# Patient Record
Sex: FEMALE | Race: WHITE | NOT HISPANIC OR LATINO | Employment: FULL TIME | URBAN - METROPOLITAN AREA
[De-identification: names, ages, dates, MRNs, and addresses within clinical notes are randomized per-mention and may not be internally consistent; named-entity substitution may affect disease eponyms.]

---

## 2017-01-06 ENCOUNTER — OFFICE VISIT (OUTPATIENT)
Dept: MEDICAL GROUP | Facility: MEDICAL CENTER | Age: 52
End: 2017-01-06
Payer: COMMERCIAL

## 2017-01-06 VITALS
BODY MASS INDEX: 36.32 KG/M2 | DIASTOLIC BLOOD PRESSURE: 72 MMHG | HEIGHT: 65 IN | OXYGEN SATURATION: 95 % | WEIGHT: 218 LBS | SYSTOLIC BLOOD PRESSURE: 104 MMHG | HEART RATE: 84 BPM | TEMPERATURE: 98.4 F

## 2017-01-06 DIAGNOSIS — H66.001 RIGHT ACUTE SUPPURATIVE OTITIS MEDIA: ICD-10-CM

## 2017-01-06 PROCEDURE — 99213 OFFICE O/P EST LOW 20 MIN: CPT | Performed by: FAMILY MEDICINE

## 2017-01-06 RX ORDER — FLUTICASONE PROPIONATE 50 MCG
2 SPRAY, SUSPENSION (ML) NASAL DAILY
Qty: 16 G | Refills: 1 | Status: SHIPPED | OUTPATIENT
Start: 2017-01-06 | End: 2017-08-07 | Stop reason: SDUPTHER

## 2017-01-06 RX ORDER — DOXYCYCLINE HYCLATE 100 MG
100 TABLET ORAL 2 TIMES DAILY
Qty: 20 TAB | Refills: 0 | Status: SHIPPED | OUTPATIENT
Start: 2017-01-06 | End: 2017-06-27

## 2017-01-06 NOTE — PROGRESS NOTES
Subjective:     Chief Complaint   Patient presents with   • Otalgia     right side       Alexandra Masterson is a 51 y.o. female here today for evaluation and management of:    Right acute suppurative otitis media  Patient complains of right ear pain for 2 weeks. She was treated for a sinus infection on 12/15/16 with Augmentin. Her cough resolved but she continues to have pain in her right ear. She has noticed some decreased hearing. She has a history of TMJ but this pain is different and she has been wearing her bite-block. She denies any fever or chills. No nausea or vomiting.       No Known Allergies    Current medicines (including changes today)  Current Outpatient Prescriptions   Medication Sig Dispense Refill   • doxycycline (VIBRAMYCIN) 100 MG Tab Take 1 Tab by mouth 2 times a day. 20 Tab 0   • fluticasone (FLONASE) 50 MCG/ACT nasal spray Spray 2 Sprays in nose every day. 16 g 1   • estradiol (ESTRACE) 0.5 MG tablet Take 1 Tab by mouth every day. 90 Tab 2   • vitamin D, Ergocalciferol, (DRISDOL) 01706 UNITS Cap capsule      • levothyroxine (SYNTHROID) 125 MCG Tab      • progesterone (PROMETRIUM) 100 MG Cap      • citalopram (CELEXA) 20 MG TABS Take 20 mg by mouth every day.     • losartan-hydrochlorothiazide (HYZAAR) 100-25 MG per tablet Take 1 Tab by mouth every day.     • amlodipine (NORVASC) 10 MG TABS Take 10 mg by mouth every day.     • buPROPion (WELLBUTRIN XL) 300 MG XL tablet Take 300 mg by mouth every morning.     • Loratadine (CLARITIN) 10 MG CAPS Take  by mouth every day.     • Multiple Vitamins-Minerals (MULTIVITAMIN PO) Take  by mouth every day.     • amoxicillin-clavulanate (AUGMENTIN) 875-125 MG Tab Take 1 Tab by mouth 2 times a day. 20 Tab 0     No current facility-administered medications for this visit.       She  has a past medical history of Heart burn; Indigestion; Snoring; Arthritis; Anesthesia; Other specified disorder of intestines; GERD (gastroesophageal reflux disease); Thyroid  "disease; Hypertension; Acquired hypothyroidism (9/21/2016); Basal cell carcinoma (9/21/2016); Depression (9/21/2016); Essential hypertension (9/21/2016); Family history of melanoma (9/21/2016); Hyperlipidemia (9/21/2016); Prediabetes (9/21/2016); Vitamin D deficiency (9/21/2016); Viral meningitis; and Substance abuse.    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  No nausea or vomiting.  No chest pain or palpitations.  No cough or SOB.  No pain with urination or hematuria.  No black or bloody stools.       Objective:     Blood pressure 104/72, pulse 84, temperature 36.9 °C (98.4 °F), height 1.651 m (5' 5\"), weight 98.884 kg (218 lb), SpO2 95 %, not currently breastfeeding. Body mass index is 36.28 kg/(m^2).   Physical Exam:  Well developed, well nourished.  Alert, oriented in no acute distress.  Eye contact is good, speech goal directed, affect calm  Eyes: conjunctiva non-injected, sclera non-icteric.  Ears: Pinna normal. Right TM is injected with some distortion of the light reflex with purulent material behind the drum. Left TM is normal  Nose: Nares are patent.  Normal mucosa  Mouth: Oral mucous membranes pink and moist with no lesions.  Neck Supple.  No adenopathy or masses in the neck or supraclavicular regions. No thyromegaly  Lungs: clear to auscultation bilaterally with good excursion. No wheezes or rhonchi  CV: regular rate and rhythm. No " murmur        Assessment and Plan:   The following treatment plan was discussed    1. Right acute suppurative otitis media  Doxycycline twice a day for 10 days. Flonase 2 puffs each nostril every morning. Patient to call in 2 weeks if not improving and we will refer to ear nose and throat.  - doxycycline (VIBRAMYCIN) 100 MG Tab; Take 1 Tab by mouth 2 times a day.  Dispense: 20 Tab; Refill: 0  - fluticasone (FLONASE) 50 MCG/ACT nasal spray; Spray 2 Sprays in nose every day.  Dispense: 16 g; Refill: 1    Any change or worsening of signs or symptoms, patient encouraged to follow-up or report to the emergency room for further evaluation. Patient understands and agrees.    Followup: Return in about 3 weeks (around 1/27/2017).

## 2017-01-06 NOTE — ASSESSMENT & PLAN NOTE
Patient complains of right ear pain for 2 weeks. She was treated for a sinus infection on 12/15/16 with Augmentin. Her cough resolved but she continues to have pain in her right ear. She has noticed some decreased hearing. She has a history of TMJ but this pain is different and she has been wearing her bite-block. She denies any fever or chills. No nausea or vomiting.

## 2017-01-06 NOTE — MR AVS SNAPSHOT
"        Alexandra Aden Aleja   2017 7:40 AM   Office Visit   MRN: [id number]    Department:  [facility]   Dept Phone:  [phone]    Description:  Female : 1965   Provider:  Esha Browne M.D.           Reason for Visit     Otalgia right side      Allergies as of 2017     No Known Allergies      You were diagnosed with     Right acute suppurative otitis media   [857597]         Vital Signs     Blood Pressure Pulse Temperature Height Weight Body Mass Index    104/72 mmHg 84 36.9 °C (98.4 °F) 1.651 m (5' 5\") 98.884 kg (218 lb) 36.28 kg/m2    Oxygen Saturation Breastfeeding? Smoking Status             95% No Former Smoker         Basic Information     Date Of Birth Sex Race Ethnicity Preferred Language    1965 Female White Non- English      Your appointments     2017  8:40 AM   New Patient with Suhail Tian M.D.   [facility] (--)    [address] [place] [id number] [phone]           Please bring enclosed paperwork completed along with your insurance card and photo ID.              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) D43.9   2013 - Present    Essential hypertension I10   2016 - Present    Acquired hypothyroidism E03.9   2016 - Present    Depression F32.9   2016 - Present    Vitamin D deficiency E55.9   2016 - Present    Hyperlipidemia E78.5   2016 - Present    Prediabetes R73.03   2016 - Present    Weight gain R63.5   2016 - Present    Snoring R06.83   2016 - Present    Daytime somnolence R40.0   2016 - Present    Adult BMI 36.0-36.9 kg/sq m Z68.36   2016 - Present    Basal cell carcinoma C44.91   2016 - Present    Family history of melanoma Z80.8   2016 - Present    Right acute suppurative otitis media H66.001   2017 - Present      Health Maintenance        Date Due " Completion Dates    MAMMOGRAM 8/25/2003 ---    PAP SMEAR 11/1/2018 11/1/2015 (Done)    Override on 11/1/2015: Done    COLONOSCOPY 1/15/2021 1/15/2016 (Done), 11/1/2015 (Done)    Override on 1/15/2016: Done    Override on 11/1/2015: Done    IMM DTaP/Tdap/Td Vaccine (2 - Td) 11/3/2025 11/3/2015            Current Immunizations     Influenza Vaccine Quad Inj (Pf) 11/8/2016  1:24 PM    Influenza Vaccine Quad Inj (Preserved) 11/8/2016    Tdap Vaccine 11/3/2015      Below and/or attached are the medications your provider expects you to take. Review all of your home medications and newly ordered medications with your provider and/or pharmacist. Follow medication instructions as directed by your provider and/or pharmacist. Please keep your medication list with you and share with your provider. Update the information when medications are discontinued, doses are changed, or new medications (including over-the-counter products) are added; and carry medication information at all times in the event of emergency situations     Allergies:  No Known Allergies          Medications  Valid as of: January 06, 2017 - 10:03 AM    Generic Name Brand Name Tablet Size Instructions for use    AmLODIPine Besylate (Tab) NORVASC 10 MG Take 10 mg by mouth every day.        Amoxicillin-Pot Clavulanate (Tab) AUGMENTIN 875-125 MG Take 1 Tab by mouth 2 times a day.        BuPROPion HCl (TABLET SR 24 HR) WELLBUTRIN  MG Take 300 mg by mouth every morning.        Citalopram Hydrobromide (Tab) CELEXA 20 MG Take 20 mg by mouth every day.        Doxycycline Hyclate (Tab) VIBRAMYCIN 100 MG Take 1 Tab by mouth 2 times a day.        Ergocalciferol (Cap) DRISDOL 10722 UNITS         Estradiol (Tab) ESTRACE 0.5 MG Take 1 Tab by mouth every day.        Fluticasone Propionate (Suspension) FLONASE 50 MCG/ACT Spray 2 Sprays in nose every day.        Levothyroxine Sodium (Tab) SYNTHROID 125 MCG         Loratadine (Cap) Loratadine 10 MG Take  by mouth every  day.        Losartan Potassium-HCTZ (Tab) HYZAAR 100-25 MG Take 1 Tab by mouth every day.        Multiple Vitamins-Minerals   Take  by mouth every day.        Progesterone Micronized (Cap) PROMETRIUM 100 MG         .                 Medicines prescribed today were sent to:     [facility] [address] [facility], [place] - [address]    [address] [place] [address]    Phone: [phone] Fax: [phone]    Open 24 Hours?: No    [facility] [address] [facility] (), [place] - [address]    [address] () [place] [address]    Phone: [phone] Fax: [phone]    Open 24 Hours?: No      Medication refill instructions:       If your prescription bottle indicates you have medication refills left, it is not necessary to call your provider’s office. Please contact your pharmacy and they will refill your medication.    If your prescription bottle indicates you do not have any refills left, you may request refills at any time through one of the following ways: The online [employer] system (except Urgent Care), by calling your provider’s office, or by asking your pharmacy to contact your provider’s office with a refill request. Medication refills are processed only during regular business hours and may not be available until the next business day. Your provider may request additional information or to have a follow-up visit with you prior to refilling your medication.   *Please Note: Medication refills are assigned a new Rx number when refilled electronically. Your pharmacy may indicate that no refills were authorized even though a new prescription for the same medication is available at the pharmacy. Please request the medicine by name with the pharmacy before contacting your provider for a refill.           [employer] Access Code: Activation code not generated  Current [employer] Status: Active

## 2017-01-30 ENCOUNTER — SLEEP CENTER VISIT (OUTPATIENT)
Dept: SLEEP MEDICINE | Facility: MEDICAL CENTER | Age: 52
End: 2017-01-30
Payer: COMMERCIAL

## 2017-01-30 VITALS
RESPIRATION RATE: 16 BRPM | HEART RATE: 80 BPM | WEIGHT: 217 LBS | DIASTOLIC BLOOD PRESSURE: 70 MMHG | SYSTOLIC BLOOD PRESSURE: 112 MMHG | BODY MASS INDEX: 36.15 KG/M2 | HEIGHT: 65 IN

## 2017-01-30 DIAGNOSIS — G47.33 OBSTRUCTIVE SLEEP APNEA: ICD-10-CM

## 2017-01-30 PROCEDURE — 99213 OFFICE O/P EST LOW 20 MIN: CPT | Performed by: INTERNAL MEDICINE

## 2017-01-30 NOTE — MR AVS SNAPSHOT
"        Alexandra Aden Aleja   2017 8:40 AM   Sleep Center Visit   MRN: [id number]    Department:  [facility]   Dept Phone:  [phone]    Description:  Female : 1965   Provider:  Suhail Tian M.D.           Reason for Visit     New Patient sleep eval      Allergies as of 2017     No Known Allergies      You were diagnosed with     Obstructive sleep apnea   [076638]         Vital Signs     Blood Pressure Pulse Respirations Height Weight Body Mass Index    112/70 mmHg 80 16 1.651 m (5' 5\") 98.431 kg (217 lb) 36.11 kg/m2    Smoking Status                   Former Smoker           Basic Information     Date Of Birth Sex Race Ethnicity Preferred Language    1965 Female White Non- English      Your appointments     Mar 02, 2017  7:10 PM   Sleep Study Diagnostic with SLEEP TECH   [facility] (--)    [address]  [place] [id number] [phone]            Mar 16, 2017  8:40 AM   Follow UP with ANASTASIIA Aguila   [facility] (--)    [address]  [address]  [place] [id number] [phone]              Problem List              ICD-10-CM Priority Class Noted - Resolved    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   2013 - Present    Essential hypertension I10   2016 - Present    Acquired hypothyroidism E03.9   2016 - Present    Depression F32.9   2016 - Present    Vitamin D deficiency E55.9   2016 - Present    Hyperlipidemia E78.5   2016 - Present    Prediabetes R73.03   2016 - Present    Weight gain R63.5   2016 - Present    Snoring R06.83   2016 - Present    Daytime somnolence R40.0   2016 - Present    Adult BMI 36.0-36.9 kg/sq m Z68.36   2016 - Present    Basal cell carcinoma C44.91   2016 - Present    Family history of melanoma Z80.8   2016 - Present    Right acute suppurative otitis media H66.001   " 1/6/2017 - Present      Health Maintenance        Date Due Completion Dates    MAMMOGRAM 8/25/2003 ---    PAP SMEAR 11/1/2018 11/1/2015 (Done)    Override on 11/1/2015: Done    COLONOSCOPY 1/15/2021 1/15/2016 (Done), 11/1/2015 (Done)    Override on 1/15/2016: Done    Override on 11/1/2015: Done    IMM DTaP/Tdap/Td Vaccine (2 - Td) 11/3/2025 11/3/2015            Current Immunizations     Influenza Vaccine Quad Inj (Pf) 11/8/2016  1:24 PM    Influenza Vaccine Quad Inj (Preserved) 11/8/2016    Tdap Vaccine 11/3/2015      Below and/or attached are the medications your provider expects you to take. Review all of your home medications and newly ordered medications with your provider and/or pharmacist. Follow medication instructions as directed by your provider and/or pharmacist. Please keep your medication list with you and share with your provider. Update the information when medications are discontinued, doses are changed, or new medications (including over-the-counter products) are added; and carry medication information at all times in the event of emergency situations     Allergies:  No Known Allergies          Medications  Valid as of: January 30, 2017 -  9:29 AM    Generic Name Brand Name Tablet Size Instructions for use    AmLODIPine Besylate (Tab) NORVASC 10 MG Take 10 mg by mouth every day.        Amoxicillin-Pot Clavulanate (Tab) AUGMENTIN 875-125 MG Take 1 Tab by mouth 2 times a day.        BuPROPion HCl (TABLET SR 24 HR) WELLBUTRIN  MG Take 300 mg by mouth every morning.        Citalopram Hydrobromide (Tab) CELEXA 20 MG Take 20 mg by mouth every day.        Doxycycline Hyclate (Tab) VIBRAMYCIN 100 MG Take 1 Tab by mouth 2 times a day.        Ergocalciferol (Cap) DRISDOL 66534 UNITS         Estradiol (Tab) ESTRACE 0.5 MG Take 1 Tab by mouth every day.        Fluticasone Propionate (Suspension) FLONASE 50 MCG/ACT Spray 2 Sprays in nose every day.        Levothyroxine Sodium (Tab) SYNTHROID 125 MCG          Loratadine (Cap) Loratadine 10 MG Take  by mouth every day.        Losartan Potassium-HCTZ (Tab) HYZAAR 100-25 MG Take 1 Tab by mouth every day.        Multiple Vitamins-Minerals   Take  by mouth every day.        Progesterone Micronized (Cap) PROMETRIUM 100 MG         .                 Medicines prescribed today were sent to:     [facility] [address] [facility], [place] - [address]    [address] [place] [address]    Phone: [phone] Fax: [phone]    Open 24 Hours?: No    [facility] [address] [facility] (), [place] - [address]    [address] () [place] [address]    Phone: [phone] Fax: [phone]    Open 24 Hours?: No      Medication refill instructions:       If your prescription bottle indicates you have medication refills left, it is not necessary to call your provider’s office. Please contact your pharmacy and they will refill your medication.    If your prescription bottle indicates you do not have any refills left, you may request refills at any time through one of the following ways: The online [employer] system (except Urgent Care), by calling your provider’s office, or by asking your pharmacy to contact your provider’s office with a refill request. Medication refills are processed only during regular business hours and may not be available until the next business day. Your provider may request additional information or to have a follow-up visit with you prior to refilling your medication.   *Please Note: Medication refills are assigned a new Rx number when refilled electronically. Your pharmacy may indicate that no refills were authorized even though a new prescription for the same medication is available at the pharmacy. Please request the medicine by name with the pharmacy before contacting your provider for a refill.        Your To Do List     Future Labs/Procedures Complete By Expires    POLYSOMNOGRAPHY, 4 OR MORE  As directed 1/30/2018    Comments:    Please do  split study if meets diagnostic criteria      Instructions    1. We will schedule a sleep study to evaluate for obstructive sleep apnea  2. We have offered a sleep aid to help with the sleep during the sleep study. Please let us know if he would like to have a sleep aid.  3. Recommend avoiding alcohol and sedatives and to not operate a motor vehicle while drowsy  4. Recommend follow-up after the sleep study          [employer] Access Code: Activation code not generated  Current [employer] Status: Active

## 2017-01-30 NOTE — PATIENT INSTRUCTIONS
1. We will schedule a sleep study to evaluate for obstructive sleep apnea  2. We have offered a sleep aid to help with the sleep during the sleep study. Please let us know if he would like to have a sleep aid.  3. Recommend avoiding alcohol and sedatives and to not operate a motor vehicle while drowsy  4. Recommend follow-up after the sleep study

## 2017-01-30 NOTE — PROGRESS NOTES
Alexandra Masterson is a 51 y.o. female here for snoring.  Patient was referred by Dr. Esha Browne.    History of Present Illness:    The patient is a 51-year-old female with a history of hypertension, hyperlipidemia and prediabetes. She comes in complaining of snoring. Apparently this is caused her  to go to a different bedroom. She's also complained of daytime sleepiness and weight gain. She does a bit at 9:00 at night and will fall sleep within a few minutes. She wake up at 5:30 in the morning feeling okay in the morning. In the afternoon she tends to get sluggish and tired. During the night she will wake up to go to the bathroom 4 times on average. She sometimes wakes up choking and gasping. She will have morning headaches. She denies waking up with a sore throat or dry throat. She does not know specifically if she stops breathing in her sleep. It's never been mentioned to her. She denies any nocturnal sweats. She has no history of sleepwalking or seizures or physically acting out dreams. She will have some sleep talking. She denies any cataplexy or sleep paralysis. She will complain of some restless leg symptoms but this occurs occasionally. This does not stop her from falling asleep. She has been kicking her legs and her sleep. During the daytime she does feel fatigued and will fall asleep in sedentary situations such as reading or watching TV. She denies any drowsy driving. She has no history of lung disease although she is a prior smoker. She smoked a half a pack a day for 30 years. She quit smoking 2 years ago. She has no cough or congestion chest pains or pleurisy. She denies a history of heart disease.    Constitutional:  Negative for fever, chills, sweats, and fatigue.  Eyes:  Negative for eye pain and visual changes.  HENT:  Negative for tinnitus and hoarse voice.  Cardiovascular:  Negative for chest pain, leg swelling, syncope and orthopnea.  Respiratory:  See HPI for pertinent  negatives  Sleep: Positive for somnolence, loud snoring, sleep disturbance due to breathing, insomnia.  Gastrointestinal:  Negative for dysphagia, nausea and abdominal pain.  Heme/lymph:  Denies easy bruising, blood clots.  Musculoskeletal:  Negative for arthralgias, sore muscles and back pain.  Skin:  Negative for rash and color change.  Neurological:  Negative for headaches, lightheadedness and weakness.  Psychiatric:  Denies depression.    Current Outpatient Prescriptions   Medication Sig Dispense Refill   • estradiol (ESTRACE) 0.5 MG tablet Take 1 Tab by mouth every day. 90 Tab 2   • vitamin D, Ergocalciferol, (DRISDOL) 55755 UNITS Cap capsule      • levothyroxine (SYNTHROID) 125 MCG Tab      • progesterone (PROMETRIUM) 100 MG Cap      • citalopram (CELEXA) 20 MG TABS Take 20 mg by mouth every day.     • losartan-hydrochlorothiazide (HYZAAR) 100-25 MG per tablet Take 1 Tab by mouth every day.     • amlodipine (NORVASC) 10 MG TABS Take 10 mg by mouth every day.     • buPROPion (WELLBUTRIN XL) 300 MG XL tablet Take 300 mg by mouth every morning.     • Multiple Vitamins-Minerals (MULTIVITAMIN PO) Take  by mouth every day.     • doxycycline (VIBRAMYCIN) 100 MG Tab Take 1 Tab by mouth 2 times a day. 20 Tab 0   • fluticasone (FLONASE) 50 MCG/ACT nasal spray Spray 2 Sprays in nose every day. 16 g 1   • amoxicillin-clavulanate (AUGMENTIN) 875-125 MG Tab Take 1 Tab by mouth 2 times a day. 20 Tab 0   • Loratadine (CLARITIN) 10 MG CAPS Take  by mouth every day.       No current facility-administered medications for this visit.       Social History   Substance Use Topics   • Smoking status: Former Smoker -- 0.50 packs/day for 15 years     Types: Cigarettes     Quit date: 09/21/2015   • Smokeless tobacco: Never Used   • Alcohol Use: No      Comment: Sober since 2011       Past Medical History   Diagnosis Date   • Heart burn    • Indigestion    • Snoring    • Arthritis      spine   • Anesthesia      nausea   • Other specified  "disorder of intestines      diarrhea and constipation   • GERD (gastroesophageal reflux disease)    • Thyroid disease    • Hypertension    • Acquired hypothyroidism 9/21/2016   • Basal cell carcinoma 9/21/2016   • Depression 9/21/2016   • Essential hypertension 9/21/2016   • Family history of melanoma 9/21/2016   • Hyperlipidemia 9/21/2016   • Prediabetes 9/21/2016   • Vitamin D deficiency 9/21/2016   • Viral meningitis    • Substance abuse      sober for 5 years   • Diabetes (CMS-HCC)    • Nasal drainage    • Scarlet fever        Past Surgical History   Procedure Laterality Date   • Yue by laparoscopy  1994   • Gyn surgery  2008     d and c   • Thyroidectomy total  8/13/2013     Performed by Adonay Vega M.D. at SURGERY SAME DAY [facility] ORS       Allergies:  Review of patient's allergies indicates no known allergies.    Family History   Problem Relation Age of Onset   • Dementia Mother    • Cancer Father      melanoma       Physical Examination    Filed Vitals:    01/30/17 0835   Height: 1.651 m (5' 5\")   Weight: 98.431 kg (217 lb)   Weight % change since last entry.: 0 %   BP: 112/70   Pulse: 80   BMI (Calculated): 36.11   Resp: 16   O2 sat % room air: 92 %   Neck circumference: 17       Physical Exam:  Constitutional:  Well developed and well nourished.  Head:  Normocephalic and atraumatic.  Nose:  Nose normal.  Mouth/Throat:  Oropharynx is clear and moist, no lesions.  Mallampati class III  Eyes:  Conjunctivae and EOM are normal.  Pupils are equal, round, and reactive to light.  Neck:  Normal range of motion.  Supple.  No JVD. No tracheal deviation.  No thyromegally  Cardiovascular:  Normal rate, regular rhythm, normal heart sounds and intact distal pulses.  Pulmonary/Chest:  No accessory muscle use.  No wheezing, rales or rhonchi.  No dullness to percussion, tenderness or deformity.  Abdominal:  Soft.  No ascites.  No Hepatosplenomegally.  Non tender.  Musculoskeletal.  Normal range of motion.  No " muscular atrophy.  Lymphadenopathy:  No cervical or supraclavicular adenopathy  Neurological:  Alert and oriented.  Cranial nerves intact.  No focal deficits  Skin:  No rashes or ulcers.  Psyciatric:  Normal mood and affect.    Assessment and Plan:  1. Obstructive sleep apnea  The patient has a history that is suggestive of obstructive sleep apnea as noted by loud snoring, nocturia, morning headaches, nocturnal gasping and choking and daytime fatigue. The patient has a crowded oropharynx and an elevated BMI. She has associated comorbid conditions to include hypertension, hyperlipidemia and prediabetes. We have scheduled a sleep study and we'll plan to see her back after the studies been completed. She is willing to try CPAP therapy. If the sleep study is negative for sleep apnea then she definitely would like to have her snoring treated. She was advised to avoid alcohol and sedatives and to not operate a motor vehicle and drowsy. We also discussed the fact that untreated sleep apnea is associated with an increased risk for cardiovascular disease.  - POLYSOMNOGRAPHY, 4 OR MORE; Future    We will see her back in 4-6 weeks to review the results of the sleep study.      Followup No Follow-up on file.

## 2017-02-15 NOTE — TELEPHONE ENCOUNTER
Was the patient seen in the last year in this department? Yes     Does patient have an active prescription for medications requested? No     Received Request Via: Patient     Patient switched pharmacy to [facility], they could not transfer medications from [facility], so she needs new RX's please.

## 2017-02-17 RX ORDER — BUPROPION HYDROCHLORIDE 300 MG/1
300 TABLET ORAL EVERY MORNING
Qty: 30 TAB | Refills: 3 | Status: SHIPPED | OUTPATIENT
Start: 2017-02-17 | End: 2017-06-26 | Stop reason: SDUPTHER

## 2017-02-17 RX ORDER — ESTRADIOL 0.5 MG/1
0.5 TABLET ORAL DAILY
Qty: 30 TAB | Refills: 3 | Status: SHIPPED | OUTPATIENT
Start: 2017-02-17 | End: 2018-01-04

## 2017-02-17 RX ORDER — LEVOTHYROXINE SODIUM 0.12 MG/1
125 TABLET ORAL
Qty: 30 TAB | Refills: 3 | Status: SHIPPED | OUTPATIENT
Start: 2017-02-17 | End: 2017-06-26 | Stop reason: SDUPTHER

## 2017-02-17 RX ORDER — ERGOCALCIFEROL 1.25 MG/1
50000 CAPSULE ORAL
Qty: 4 CAP | Refills: 3 | Status: SHIPPED | OUTPATIENT
Start: 2017-02-17 | End: 2017-06-26 | Stop reason: SDUPTHER

## 2017-02-17 RX ORDER — LOSARTAN POTASSIUM AND HYDROCHLOROTHIAZIDE 25; 100 MG/1; MG/1
1 TABLET ORAL DAILY
Qty: 30 TAB | Refills: 3 | Status: SHIPPED | OUTPATIENT
Start: 2017-02-17 | End: 2017-06-26 | Stop reason: SDUPTHER

## 2017-02-17 RX ORDER — CITALOPRAM 20 MG/1
20 TABLET ORAL DAILY
Qty: 30 TAB | Refills: 3 | Status: SHIPPED | OUTPATIENT
Start: 2017-02-17 | End: 2017-07-06 | Stop reason: SDUPTHER

## 2017-02-17 RX ORDER — AMLODIPINE BESYLATE 10 MG/1
10 TABLET ORAL DAILY
Qty: 30 TAB | Refills: 3 | Status: SHIPPED | OUTPATIENT
Start: 2017-02-17 | End: 2017-06-26 | Stop reason: SDUPTHER

## 2017-03-02 ENCOUNTER — SLEEP STUDY (OUTPATIENT)
Dept: SLEEP MEDICINE | Facility: MEDICAL CENTER | Age: 52
End: 2017-03-02
Attending: INTERNAL MEDICINE
Payer: COMMERCIAL

## 2017-03-02 DIAGNOSIS — G47.33 OBSTRUCTIVE SLEEP APNEA: ICD-10-CM

## 2017-03-13 PROCEDURE — 95810 POLYSOM 6/> YRS 4/> PARAM: CPT | Performed by: INTERNAL MEDICINE

## 2017-03-13 NOTE — PROCEDURES
Recording technique:     After the scalp was prepared, goal plated electrodes were applied to the scalp according to the international 10-20 system. The electroencephalogram was continuously monitored from 01-M2, O2-M1, C3-M2, C4-N1, F3-M2 and F4-M1. The electrooculogram was monitored by electrodes placed at the left and right outer canthi. The chin electromyogram was monitored by electrodes placed on the mentalis and submentalis. Nasal and oral airflow were measured using a triple port thermocouple as well as an oronasal pressure transducer. Respiratory effort was measured by inductive plethysmography technology implying abdominal and thoracic belts. Arterial oxygen saturation and pulse were monitored by pulse oximetry. Heart rate was monitored by surface electrocardiogram. The leg electromyogram was studied using surface electrodes placed on the left and right anterior tibialis. A microphone was used to monitor tracheal sounds and snoring. Body position was monitored and documented by technician observation. This was a fully attended study and the data appears to be of good quality for analysis.      Interpretation:    This is a split-night study.    In the diagnostic phase there is mild fragmentation of sleep with reduced sleep efficiency and a moderate increase in the arousal and awakening index.  There are frequent periodic limb movements but they do not affect sleep continuity.  The apnea hypopnea index is 12.6 events per hour, primarily including hypopnea events with occasional obstructive and central apneas.  The index climbs to 22.6 events per hour in supine sleep but no REM sleep time is recorded.  There is some respiratory effort related arousals and the respiratory disturbance index is 19.3 events per hour.  The lowest arterial oxygen saturation is 76% on room air.  She spends about 89% of the diagnostic time with a saturation below 90%.    In the treatment phase of the study there is some deterioration  in sleep continuity with increased wake after sleep onset time but a fall in the arousal and awakening index.  The periodic limb movements decrease dramatically in frequency.  CPAP was adjusted across a pressure range of 4-13 cm water pressure.  The higher pressures were used to address residual hypopnea events during REM sleep.  A long period of REM sleep time was identified suggesting a rebound effect. In the final 2 pressure stages, the apnea hypopnea index was 8 to 9 events per hour with a mean arterial oxygen saturation of about 91% and a minimum saturation of 87%.  Those stages in the titration included significant REM sleep time and were done in the supine body position.    Assessment:   Mild to moderate obstructive sleep apnea hypopnea with an apnea hypopnea index of 12.6 events per hour and a respiratory disturbance index of 19.3 events per hour.  Severe nocturnal hypoxemia with a lowest arterial oxygen saturation of 76% on room air.  Fragmentation of sleep related to the breathing events and probably to laboratory effect as well.  There are periodic limb movements but they do not and a few with sleep continuity.  There is a significant, but perhaps incomplete response to CPAP therapy.    Recommendations:  CPAP at 12 cm water pressure would be reasonable.  With the persistence of hypopnea events in rem sleep during the final pressure stages, auto titrating CPAP with a pressure range of perhaps 8-15 cm water pressure might be considered as an alternative.  She did best with a small Otis nasal mask and heated humidification.

## 2017-03-16 ENCOUNTER — SLEEP CENTER VISIT (OUTPATIENT)
Dept: SLEEP MEDICINE | Facility: MEDICAL CENTER | Age: 52
End: 2017-03-16
Payer: COMMERCIAL

## 2017-03-16 VITALS
HEART RATE: 92 BPM | WEIGHT: 219 LBS | HEIGHT: 65 IN | SYSTOLIC BLOOD PRESSURE: 122 MMHG | BODY MASS INDEX: 36.49 KG/M2 | RESPIRATION RATE: 15 BRPM | DIASTOLIC BLOOD PRESSURE: 72 MMHG

## 2017-03-16 DIAGNOSIS — G47.33 OSA (OBSTRUCTIVE SLEEP APNEA): ICD-10-CM

## 2017-03-16 DIAGNOSIS — R06.83 SNORING: ICD-10-CM

## 2017-03-16 DIAGNOSIS — R40.0 DAYTIME SOMNOLENCE: ICD-10-CM

## 2017-03-16 DIAGNOSIS — I10 ESSENTIAL HYPERTENSION: ICD-10-CM

## 2017-03-16 PROCEDURE — 99213 OFFICE O/P EST LOW 20 MIN: CPT | Performed by: NURSE PRACTITIONER

## 2017-03-16 NOTE — PATIENT INSTRUCTIONS
1. Initiate auto CPAP, [facility] to [facility]. you should hear from them in 7-10 days.   2. Follow-up in 4-6 weeks, sooner if needed

## 2017-03-16 NOTE — PROGRESS NOTES
Chief Complaint   Patient presents with   • Results     SS         HPI: This patient is a 51 y.o. female, who presents for sleep study results. She was recently referred for evaluation of suspected sleep-disordered breathing. She has a  history of hypertension, hyperlipidemia and prediabetes. Sleep symptoms include snoring, daytime sleepiness with weight gain, occasional resuscitative gasping, a.m. headaches, she wakes up with a dry throat. She is a former smoker, 15 pack year history quit 2 years ago. Denies respiratory symptoms. No history of heart disease. PSG indicates AHI of 12.6, REM index of 22.6, minimum saturation 76%. She was titrated on a range of CPAP pressures. On CPAP 12 cm H2O she achieved 30 minutes of REM, AHI was reduced to 9, mean saturation 90%. Auto CPAP in the range of 8-15 is recommended for residual events. I reviewed these results in detail with the patient. She is amenable to initiating CPAP.       Past Medical History   Diagnosis Date   • Heart burn    • Indigestion    • Snoring    • Arthritis      spine   • Anesthesia      nausea   • Other specified disorder of intestines      diarrhea and constipation   • GERD (gastroesophageal reflux disease)    • Thyroid disease    • Hypertension    • Acquired hypothyroidism 9/21/2016   • Basal cell carcinoma 9/21/2016   • Depression 9/21/2016   • Essential hypertension 9/21/2016   • Family history of melanoma 9/21/2016   • Hyperlipidemia 9/21/2016   • Prediabetes 9/21/2016   • Vitamin D deficiency 9/21/2016   • Viral meningitis    • Substance abuse      sober for 5 years   • Diabetes (CMS-formerly [facility])    • Nasal drainage    • Scarlet fever        Social History   Substance Use Topics   • Smoking status: Former Smoker -- 0.50 packs/day for 15 years     Types: Cigarettes     Quit date: 09/21/2015   • Smokeless tobacco: Never Used   • Alcohol Use: No      Comment: Sober since 2011       Family History   Problem Relation Age of Onset   • Dementia Mother    •  "Cancer Father      melanoma       Current medications as of today   Current Outpatient Prescriptions   Medication Sig Dispense Refill   • buPROPion (WELLBUTRIN XL) 300 MG XL tablet Take 1 Tab by mouth every morning. 30 Tab 3   • citalopram (CELEXA) 20 MG Tab Take 1 Tab by mouth every day. 30 Tab 3   • vitamin D, Ergocalciferol, (DRISDOL) 91614 UNITS Cap capsule Take 1 Cap by mouth every 7 days. 4 Cap 3   • progesterone (PROMETRIUM) 100 MG Cap Take 1 Cap by mouth every day. 30 Cap 3   • losartan-hydrochlorothiazide (HYZAAR) 100-25 MG per tablet Take 1 Tab by mouth every day. 30 Tab 3   • levothyroxine (SYNTHROID) 125 MCG Tab Take 1 Tab by mouth Every morning on an empty stomach. 30 Tab 3   • amlodipine (NORVASC) 10 MG Tab Take 1 Tab by mouth every day. 30 Tab 3   • estradiol (ESTRACE) 0.5 MG tablet Take 1 Tab by mouth every day. 30 Tab 3   • fluticasone (FLONASE) 50 MCG/ACT nasal spray Spray 2 Sprays in nose every day. 16 g 1   • Loratadine (CLARITIN) 10 MG CAPS Take  by mouth every day.     • Multiple Vitamins-Minerals (MULTIVITAMIN PO) Take  by mouth every day.     • doxycycline (VIBRAMYCIN) 100 MG Tab Take 1 Tab by mouth 2 times a day. 20 Tab 0   • amoxicillin-clavulanate (AUGMENTIN) 875-125 MG Tab Take 1 Tab by mouth 2 times a day. 20 Tab 0     No current facility-administered medications for this visit.       Allergies: Review of patient's allergies indicates no known allergies.    Blood pressure 122/72, pulse 92, resp. rate 15, height 1.651 m (5' 5\"), weight 99.338 kg (219 lb).      ROS:   Constitutional: Denies fevers, chills, night sweats, weight loss. Positive for fatigue.  HEENT: Denies earache, difficulty hearing, tinnitus, nasal congestion, hoarseness  Cardiovascular: Denies chest pain, tightness, palpitations, orthopnea or edema  Respiratory: Denies cough, wheeze, dyspnea, hemoptysis  Sleep: See HPI  GI: Denies heartburn, dysphagia, nausea, abdominal pain, diarrhea or constipation  : Denies frequent " urination, hematuria, discharge or painful urination  Musculoskeletal: Denies back pain, painful joints, sore muscles  Neurological: Denies weakness or headaches  Skin: No rashes    Physical exam:   Appearance: Well-nourished, well-developed, in no acute distress  HEENT: Normocephalic, atraumatic, white sclera, PERRLA  Respiratory: no intercostal retractions or accessory muscle use   Lungs auscultation: Clear to auscultation bilaterally  Cardiovascular: Regular rate rhythm no murmurs, rubs or gallops  Gait: Normal  Digits: No clubbing, cyanosis  Motor: No focal deficits  Orientation: Oriented to time, person and place    Diagnosis:  1. Adult BMI 36.0-36.9 kg/sq m     2. Daytime somnolence     3. Essential hypertension     4. Snoring     5. EMRE (obstructive sleep apnea)  DME CPAP       Plan:  1. Initiate auto CPAP, [facility]  2. Follow-up in 4-6 weeks, sooner if needed

## 2017-03-16 NOTE — MR AVS SNAPSHOT
"Alexandra Masterson   3/16/2017 8:40 AM   Sleep Center Visit   MRN: [id number]    Department:  [facility]   Dept Phone:  [phone]    Description:  Female : 1965   Provider:  ANASTASIIA Aguila           Reason for Visit     Results SS      Allergies as of 3/16/2017     No Known Allergies      You were diagnosed with     Adult BMI 36.0-36.9 kg/sq m   [848346]       Daytime somnolence   [947208]       Essential hypertension   [5222945]       Snoring   [133486]       EMRE (obstructive sleep apnea)   [720033]         Vital Signs     Blood Pressure Pulse Respirations Height Weight Body Mass Index    122/72 mmHg 92 15 1.651 m (5' 5\") 99.338 kg (219 lb) 36.44 kg/m2    Smoking Status                   Former Smoker           Basic Information     Date Of Birth Sex Race Ethnicity Preferred Language    1965 Female White Non- English      Your appointments     2017  8:00 AM   Follow UP with ANASTASIIA Aguila   [facility] (--)    [address] [address]   [phone]              Problem List              ICD-10-CM Priority Class Noted - Resolved    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   2013 - Present    Essential hypertension I10   2016 - Present    Acquired hypothyroidism E03.9   2016 - Present    Depression F32.9   2016 - Present    Vitamin D deficiency E55.9   2016 - Present    Hyperlipidemia E78.5   2016 - Present    Prediabetes R73.03   2016 - Present    Weight gain R63.5   2016 - Present    Snoring R06.83   2016 - Present    Daytime somnolence R40.0   2016 - Present    Adult BMI 36.0-36.9 kg/sq m Z68.36   2016 - Present    Basal cell carcinoma C44.91   2016 - Present    Family history of melanoma Z80.8   2016 - Present    Right acute suppurative otitis media H66.001   2017 - Present      Health " Maintenance        Date Due Completion Dates    MAMMOGRAM 8/25/2003 ---    PAP SMEAR 11/1/2018 11/1/2015 (Done)    Override on 11/1/2015: Done    COLONOSCOPY 1/15/2021 1/15/2016 (Done), 11/1/2015 (Done)    Override on 1/15/2016: Done    Override on 11/1/2015: Done    IMM DTaP/Tdap/Td Vaccine (2 - Td) 11/3/2025 11/3/2015            Current Immunizations     Influenza Vaccine Quad Inj (Pf) 11/8/2016  1:24 PM    Influenza Vaccine Quad Inj (Preserved) 11/8/2016    Tdap Vaccine 11/3/2015      Below and/or attached are the medications your provider expects you to take. Review all of your home medications and newly ordered medications with your provider and/or pharmacist. Follow medication instructions as directed by your provider and/or pharmacist. Please keep your medication list with you and share with your provider. Update the information when medications are discontinued, doses are changed, or new medications (including over-the-counter products) are added; and carry medication information at all times in the event of emergency situations     Allergies:  No Known Allergies          Medications  Valid as of: March 16, 2017 -  9:01 AM    Generic Name Brand Name Tablet Size Instructions for use    AmLODIPine Besylate (Tab) NORVASC 10 MG Take 1 Tab by mouth every day.        Amoxicillin-Pot Clavulanate (Tab) AUGMENTIN 875-125 MG Take 1 Tab by mouth 2 times a day.        BuPROPion HCl (TABLET SR 24 HR) WELLBUTRIN  MG Take 1 Tab by mouth every morning.        Citalopram Hydrobromide (Tab) CELEXA 20 MG Take 1 Tab by mouth every day.        Doxycycline Hyclate (Tab) VIBRAMYCIN 100 MG Take 1 Tab by mouth 2 times a day.        Ergocalciferol (Cap) DRISDOL 78931 UNITS Take 1 Cap by mouth every 7 days.        Estradiol (Tab) ESTRACE 0.5 MG Take 1 Tab by mouth every day.        Fluticasone Propionate (Suspension) FLONASE 50 MCG/ACT Spray 2 Sprays in nose every day.        Levothyroxine Sodium (Tab) SYNTHROID 125 MCG Take 1  Tab by mouth Every morning on an empty stomach.        Loratadine (Cap) Loratadine 10 MG Take  by mouth every day.        Losartan Potassium-HCTZ (Tab) HYZAAR 100-25 MG Take 1 Tab by mouth every day.        Multiple Vitamins-Minerals   Take  by mouth every day.        Progesterone Micronized (Cap) PROMETRIUM 100 MG Take 1 Cap by mouth every day.        .                 Medicines prescribed today were sent to:     [facility], [place] - [address]    [address] [place] [address]    Phone: [phone] Fax: [phone]    Open 24 Hours?: No    [facility] (), [place] - [address]    [address] () [place] [address]    Phone: [phone] Fax: [phone]    Open 24 Hours?: No      Medication refill instructions:       If your prescription bottle indicates you have medication refills left, it is not necessary to call your provider’s office. Please contact your pharmacy and they will refill your medication.    If your prescription bottle indicates you do not have any refills left, you may request refills at any time through one of the following ways: The online [employer] system (except Urgent Care), by calling your provider’s office, or by asking your pharmacy to contact your provider’s office with a refill request. Medication refills are processed only during regular business hours and may not be available until the next business day. Your provider may request additional information or to have a follow-up visit with you prior to refilling your medication.   *Please Note: Medication refills are assigned a new Rx number when refilled electronically. Your pharmacy may indicate that no refills were authorized even though a new prescription for the same medication is available at the pharmacy. Please request the medicine by name with the pharmacy before contacting your provider for a refill.        Instructions    1. Initiate auto CPAP, [facility] to [facility].  you should hear from them in 7-10 days.   2. Follow-up in 4-6 weeks, sooner if needed            MyChart Access Code: Activation code not generated  Current MyChart Status: Active

## 2017-04-27 ENCOUNTER — SLEEP CENTER VISIT (OUTPATIENT)
Dept: SLEEP MEDICINE | Facility: MEDICAL CENTER | Age: 52
End: 2017-04-27
Payer: COMMERCIAL

## 2017-04-27 VITALS
SYSTOLIC BLOOD PRESSURE: 110 MMHG | HEIGHT: 65 IN | BODY MASS INDEX: 36.49 KG/M2 | HEART RATE: 86 BPM | RESPIRATION RATE: 14 BRPM | WEIGHT: 219 LBS | DIASTOLIC BLOOD PRESSURE: 62 MMHG

## 2017-04-27 DIAGNOSIS — R06.83 SNORING: ICD-10-CM

## 2017-06-17 ENCOUNTER — OFFICE VISIT (OUTPATIENT)
Dept: URGENT CARE | Facility: CLINIC | Age: 52
End: 2017-06-17
Payer: COMMERCIAL

## 2017-06-17 VITALS
RESPIRATION RATE: 16 BRPM | DIASTOLIC BLOOD PRESSURE: 70 MMHG | OXYGEN SATURATION: 92 % | SYSTOLIC BLOOD PRESSURE: 118 MMHG | TEMPERATURE: 98.4 F | BODY MASS INDEX: 37.79 KG/M2 | HEIGHT: 65 IN | WEIGHT: 226.8 LBS | HEART RATE: 87 BPM

## 2017-06-17 DIAGNOSIS — H69.92 ETD (EUSTACHIAN TUBE DYSFUNCTION), LEFT: ICD-10-CM

## 2017-06-17 DIAGNOSIS — J02.9 SORE THROAT: ICD-10-CM

## 2017-06-17 DIAGNOSIS — H92.02 OTALGIA, LEFT: ICD-10-CM

## 2017-06-17 PROCEDURE — 99214 OFFICE O/P EST MOD 30 MIN: CPT | Performed by: PHYSICIAN ASSISTANT

## 2017-06-17 RX ORDER — DEXAMETHASONE 4 MG/1
8 TABLET ORAL DAILY
Qty: 6 TAB | Refills: 0 | Status: SHIPPED | OUTPATIENT
Start: 2017-06-17 | End: 2017-06-20

## 2017-06-17 RX ORDER — CEFUROXIME AXETIL 500 MG/1
500 TABLET ORAL 2 TIMES DAILY
Qty: 14 TAB | Refills: 0 | Status: SHIPPED | OUTPATIENT
Start: 2017-06-17 | End: 2017-06-24

## 2017-06-17 ASSESSMENT — ENCOUNTER SYMPTOMS
DIZZINESS: 0
COUGH: 0
RESPIRATORY NEGATIVE: 1
WEAKNESS: 1
SORE THROAT: 1
EYES NEGATIVE: 1

## 2017-06-17 NOTE — PROGRESS NOTES
"Subjective:      Alexandra Masterson is a 51 y.o. female who presents with Otalgia            Otalgia   There is pain in the left ear. This is a new (L ear pn , pn swallowing left side) problem. The current episode started in the past 7 days. The problem occurs constantly. The problem has been unchanged. There has been no fever. The pain is moderate. Associated symptoms include a sore throat. Pertinent negatives include no coughing, ear discharge or hearing loss. She has tried nothing for the symptoms. The treatment provided no relief. There is no history of a chronic ear infection, hearing loss or a tympanostomy tube.       Review of Systems   HENT: Positive for ear pain and sore throat. Negative for ear discharge and hearing loss.    Eyes: Negative.    Respiratory: Negative.  Negative for cough.    Skin: Negative.    Neurological: Positive for weakness. Negative for dizziness.          Objective:     /70 mmHg  Pulse 87  Temp(Src) 36.9 °C (98.4 °F)  Resp 16  Ht 1.651 m (5' 5\")  Wt 102.876 kg (226 lb 12.8 oz)  BMI 37.74 kg/m2  SpO2 92%     Physical Exam   Constitutional: She is oriented to person, place, and time. She appears well-developed and well-nourished. No distress.   HENT:   Head: Normocephalic and atraumatic.   Left Ear: External ear normal.   Mouth/Throat: Oropharynx is clear and moist.   Eyes: EOM are normal. Pupils are equal, round, and reactive to light.   Neck: Normal range of motion. Neck supple.   Lymphadenopathy:     She has cervical adenopathy (L side below ear, in jaw angle area (eust.tube?)).   Neurological: She is alert and oriented to person, place, and time.   Skin: Skin is warm and dry.   Psychiatric: She has a normal mood and affect. Her behavior is normal. Judgment and thought content normal.   Nursing note and vitals reviewed.    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   • Essential hypertension 09/21/2016   • Acquired hypothyroidism 09/21/2016   • Depression 09/21/2016   • Vitamin D deficiency 09/21/2016   • Hyperlipidemia 09/21/2016   • Prediabetes 09/21/2016   • Weight gain 09/21/2016   • Snoring 09/21/2016   • Daytime somnolence 09/21/2016   • Adult BMI 36.0-36.9 kg/sq m 09/21/2016   • Basal cell carcinoma 09/21/2016   • Family history of melanoma 09/21/2016   • Right acute suppurative otitis media 01/06/2017     Resolved Ambulatory Problems     Diagnosis Date Noted   • No Resolved Ambulatory Problems     Past Medical History   Diagnosis Date   • Heart burn    • Indigestion    • Arthritis    • Anesthesia    • Other specified disorder of intestines    • GERD (gastroesophageal reflux disease)    • Thyroid disease    • Hypertension    • Viral meningitis    • Substance abuse    • Diabetes (CMS-HCC)    • Nasal drainage    • Scarlet fever      Current Outpatient Prescriptions on File Prior to Visit   Medication Sig Dispense Refill   • buPROPion (WELLBUTRIN XL) 300 MG XL tablet Take 1 Tab by mouth every morning. 30 Tab 3   • citalopram (CELEXA) 20 MG Tab Take 1 Tab by mouth every day. 30 Tab 3   • vitamin D, Ergocalciferol, (DRISDOL) 65691 UNITS Cap capsule Take 1 Cap by mouth every 7 days. 4 Cap 3   • progesterone (PROMETRIUM) 100 MG Cap Take 1 Cap by mouth every day. 30 Cap 3   • losartan-hydrochlorothiazide (HYZAAR) 100-25 MG per tablet Take 1 Tab by mouth every day. 30 Tab 3   • levothyroxine (SYNTHROID) 125 MCG Tab Take 1 Tab by mouth Every morning on an empty stomach. 30 Tab 3   • amlodipine (NORVASC) 10 MG Tab Take 1 Tab by mouth every day. 30 Tab 3   • estradiol (ESTRACE) 0.5 MG tablet Take 1 Tab by mouth every day. 30 Tab 3   • fluticasone (FLONASE) 50 MCG/ACT nasal spray Spray 2 Sprays in nose every day. 16 g 1   • Multiple  Vitamins-Minerals (MULTIVITAMIN PO) Take  by mouth every day.     • doxycycline (VIBRAMYCIN) 100 MG Tab Take 1 Tab by mouth 2 times a day. 20 Tab 0   • amoxicillin-clavulanate (AUGMENTIN) 875-125 MG Tab Take 1 Tab by mouth 2 times a day. 20 Tab 0   • Loratadine (CLARITIN) 10 MG CAPS Take  by mouth every day.       No current facility-administered medications on file prior to visit.     Gargles, Cepacol lozenges, Aleve/Advil as needed for throat pain  Family History   Problem Relation Age of Onset   • Dementia Mother    • Cancer Father      melanoma     Review of patient's allergies indicates no known allergies.              Assessment/Plan:     ·  L side       ·

## 2017-06-17 NOTE — MR AVS SNAPSHOT
"        Alexandra Freitasalba   2017 9:25 AM   Office Visit   MRN: [id number]    Department:  [facility]   Dept Phone:  [phone]    Description:  Female : 1965   Provider:  Khadar Perez PA-C           Reason for Visit     Otalgia x2days, ear pain, lump on side of left ear, hurts to swollow      Allergies as of 2017     No Known Allergies      You were diagnosed with     ETD (eustachian tube dysfunction), left   [789584]         Vital Signs     Blood Pressure Pulse Temperature Respirations Height Weight    118/70 mmHg 87 36.9 °C (98.4 °F) 16 1.651 m (5' 5\") 102.876 kg (226 lb 12.8 oz)    Body Mass Index Oxygen Saturation Smoking Status             37.74 kg/m2 92% Former Smoker         Basic Information     Date Of Birth Sex Race Ethnicity Preferred Language    1965 Female White Non- English      Your appointments     2017  1:00 PM   Follow UP with ANASTASIIA Aguila   [facility] (--)    [address] [address]   [phone]              Problem List              ICD-10-CM Priority Class Noted - Resolved    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   2013 - Present    Essential hypertension I10   2016 - Present    Acquired hypothyroidism E03.9   2016 - Present    Depression F32.9   2016 - Present    Vitamin D deficiency E55.9   2016 - Present    Hyperlipidemia E78.5   2016 - Present    Prediabetes R73.03   2016 - Present    Weight gain R63.5   2016 - Present    Snoring R06.83   2016 - Present    Daytime somnolence R40.0   2016 - Present    Adult BMI 36.0-36.9 kg/sq m Z68.36   2016 - Present    Basal cell carcinoma C44.91   2016 - Present    Family history of melanoma Z80.8   2016 - Present    Right acute suppurative otitis media H66.001   2017 - Present      Health Maintenance        Date Due Completion Dates   " MAMMOGRAM 8/25/2003 ---    PAP SMEAR 11/1/2018 11/1/2015 (Done)    Override on 11/1/2015: Done    COLONOSCOPY 1/15/2021 1/15/2016 (Done), 11/1/2015 (Done)    Override on 1/15/2016: Done    Override on 11/1/2015: Done    IMM DTaP/Tdap/Td Vaccine (2 - Td) 11/3/2025 11/3/2015            Current Immunizations     Influenza Vaccine Quad Inj (Pf) 11/8/2016  1:24 PM    Influenza Vaccine Quad Inj (Preserved) 11/8/2016    Tdap Vaccine 11/3/2015      Below and/or attached are the medications your provider expects you to take. Review all of your home medications and newly ordered medications with your provider and/or pharmacist. Follow medication instructions as directed by your provider and/or pharmacist. Please keep your medication list with you and share with your provider. Update the information when medications are discontinued, doses are changed, or new medications (including over-the-counter products) are added; and carry medication information at all times in the event of emergency situations     Allergies:  No Known Allergies          Medications  Valid as of: June 17, 2017 - 10:02 AM    Generic Name Brand Name Tablet Size Instructions for use    AmLODIPine Besylate (Tab) NORVASC 10 MG Take 1 Tab by mouth every day.        Amoxicillin-Pot Clavulanate (Tab) AUGMENTIN 875-125 MG Take 1 Tab by mouth 2 times a day.        BuPROPion HCl (TABLET SR 24 HR) WELLBUTRIN  MG Take 1 Tab by mouth every morning.        Citalopram Hydrobromide (Tab) CELEXA 20 MG Take 1 Tab by mouth every day.        Doxycycline Hyclate (Tab) VIBRAMYCIN 100 MG Take 1 Tab by mouth 2 times a day.        Ergocalciferol (Cap) DRISDOL 84393 UNITS Take 1 Cap by mouth every 7 days.        Estradiol (Tab) ESTRACE 0.5 MG Take 1 Tab by mouth every day.        Fluticasone Propionate (Suspension) FLONASE 50 MCG/ACT Spray 2 Sprays in nose every day.        Levothyroxine Sodium (Tab) SYNTHROID 125 MCG Take 1 Tab by mouth Every morning on an empty stomach.         Loratadine (Cap) Loratadine 10 MG Take  by mouth every day.        Losartan Potassium-HCTZ (Tab) HYZAAR 100-25 MG Take 1 Tab by mouth every day.        Multiple Vitamins-Minerals   Take  by mouth every day.        Progesterone Micronized (Cap) PROMETRIUM 100 MG Take 1 Cap by mouth every day.        .                 Medicines prescribed today were sent to:     [facility] [address] [facility], [place] - [address]    [address] [facility] [place] [address]    Phone: [phone] Fax: [phone]    Open 24 Hours?: No    [facility] [address] [facility] (), [place] - [address]    [address] () [place] [address]    Phone: [phone] Fax: [phone]    Open 24 Hours?: No      Medication refill instructions:       If your prescription bottle indicates you have medication refills left, it is not necessary to call your provider’s office. Please contact your pharmacy and they will refill your medication.    If your prescription bottle indicates you do not have any refills left, you may request refills at any time through one of the following ways: The online [employer] system (except Urgent Care), by calling your provider’s office, or by asking your pharmacy to contact your provider’s office with a refill request. Medication refills are processed only during regular business hours and may not be available until the next business day. Your provider may request additional information or to have a follow-up visit with you prior to refilling your medication.   *Please Note: Medication refills are assigned a new Rx number when refilled electronically. Your pharmacy may indicate that no refills were authorized even though a new prescription for the same medication is available at the pharmacy. Please request the medicine by name with the pharmacy before contacting your provider for a refill.           [employer] Access Code: Activation code not generated  Current [employer] Status: Active

## 2017-06-27 ENCOUNTER — SLEEP CENTER VISIT (OUTPATIENT)
Dept: SLEEP MEDICINE | Facility: MEDICAL CENTER | Age: 52
End: 2017-06-27
Payer: COMMERCIAL

## 2017-06-27 VITALS
WEIGHT: 226.3 LBS | OXYGEN SATURATION: 93 % | DIASTOLIC BLOOD PRESSURE: 65 MMHG | SYSTOLIC BLOOD PRESSURE: 132 MMHG | TEMPERATURE: 97.9 F | RESPIRATION RATE: 15 BRPM | HEART RATE: 87 BPM | HEIGHT: 65 IN | BODY MASS INDEX: 37.7 KG/M2

## 2017-06-27 DIAGNOSIS — I10 ESSENTIAL HYPERTENSION: ICD-10-CM

## 2017-06-27 DIAGNOSIS — E03.9 ACQUIRED HYPOTHYROIDISM: ICD-10-CM

## 2017-06-27 DIAGNOSIS — G47.33 OSA (OBSTRUCTIVE SLEEP APNEA): ICD-10-CM

## 2017-06-27 PROCEDURE — 99213 OFFICE O/P EST LOW 20 MIN: CPT | Performed by: NURSE PRACTITIONER

## 2017-06-27 RX ORDER — BUPROPION HYDROCHLORIDE 300 MG/1
TABLET ORAL
Qty: 30 TAB | Refills: 2 | Status: SHIPPED | OUTPATIENT
Start: 2017-06-27 | End: 2017-09-29 | Stop reason: SDUPTHER

## 2017-06-27 RX ORDER — LOSARTAN POTASSIUM AND HYDROCHLOROTHIAZIDE 25; 100 MG/1; MG/1
TABLET ORAL
Qty: 30 TAB | Refills: 2 | Status: SHIPPED | OUTPATIENT
Start: 2017-06-27 | End: 2017-09-29 | Stop reason: SDUPTHER

## 2017-06-27 RX ORDER — AMLODIPINE BESYLATE 10 MG/1
TABLET ORAL
Qty: 30 TAB | Refills: 2 | Status: SHIPPED | OUTPATIENT
Start: 2017-06-27 | End: 2017-09-29 | Stop reason: SDUPTHER

## 2017-06-27 RX ORDER — ERGOCALCIFEROL 1.25 MG/1
CAPSULE ORAL
Qty: 4 CAP | Refills: 2 | Status: SHIPPED | OUTPATIENT
Start: 2017-06-27 | End: 2017-09-29 | Stop reason: SDUPTHER

## 2017-06-27 RX ORDER — LEVOTHYROXINE SODIUM 0.12 MG/1
TABLET ORAL
Qty: 30 TAB | Refills: 2 | Status: SHIPPED | OUTPATIENT
Start: 2017-06-27 | End: 2017-09-29 | Stop reason: SDUPTHER

## 2017-06-27 NOTE — MR AVS SNAPSHOT
"Alexandra Masterson   2017 1:00 PM   Sleep Center Visit   MRN: [id number]    Department:  [facility]   Dept Phone:  [phone]    Description:  Female : 1965   Provider:  ANASTASIIA Aguila           Reason for Visit     Follow-Up 6 week complience      Allergies as of 2017     No Known Allergies      You were diagnosed with     Essential hypertension   [2164945]       Acquired hypothyroidism   [4385910]       EMRE (obstructive sleep apnea)   [504821]       Adult BMI 36.0-36.9 kg/sq m   [187156]         Vital Signs     Blood Pressure Pulse Temperature Respirations Height Weight    132/65 mmHg 87 36.6 °C (97.9 °F) 15 1.651 m (5' 5\") 102.649 kg (226 lb 4.8 oz)    Body Mass Index Oxygen Saturation Smoking Status             37.66 kg/m2 93% Former Smoker         Basic Information     Date Of Birth Sex Race Ethnicity Preferred Language    1965 Female White Non- English      Your appointments     Dec 28, 2017  3:40 PM   Follow UP with ANASTASIIA Aguila   [facility] (--)    [address] [facility] [address] [phone]              Problem List              ICD-10-CM Priority Class Noted - Resolved    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   2013 - Present    Essential hypertension I10   2016 - Present    Acquired hypothyroidism E03.9   2016 - Present    Depression F32.9   2016 - Present    Vitamin D deficiency E55.9   2016 - Present    Hyperlipidemia E78.5   2016 - Present    Prediabetes R73.03   2016 - Present    Weight gain R63.5   2016 - Present    Snoring R06.83   2016 - Present    Daytime somnolence R40.0   2016 - Present    Adult BMI 36.0-36.9 kg/sq m Z68.36   2016 - Present    Basal cell carcinoma C44.91   2016 - Present    Family history of melanoma Z80.8   2016 - Present    Right acute suppurative otitis media " H66.001   1/6/2017 - Present    EMRE (obstructive sleep apnea) G47.33   6/27/2017 - Present      Health Maintenance        Date Due Completion Dates    MAMMOGRAM 8/25/2003 ---    PAP SMEAR 11/1/2018 11/1/2015 (Done)    Override on 11/1/2015: Done    COLONOSCOPY 1/15/2021 1/15/2016 (Done), 11/1/2015 (Done)    Override on 1/15/2016: Done    Override on 11/1/2015: Done    IMM DTaP/Tdap/Td Vaccine (2 - Td) 11/3/2025 11/3/2015            Current Immunizations     Influenza Vaccine Quad Inj (Pf) 11/8/2016  1:24 PM    Influenza Vaccine Quad Inj (Preserved) 11/8/2016    Tdap Vaccine 11/3/2015      Below and/or attached are the medications your provider expects you to take. Review all of your home medications and newly ordered medications with your provider and/or pharmacist. Follow medication instructions as directed by your provider and/or pharmacist. Please keep your medication list with you and share with your provider. Update the information when medications are discontinued, doses are changed, or new medications (including over-the-counter products) are added; and carry medication information at all times in the event of emergency situations     Allergies:  No Known Allergies          Medications  Valid as of: June 27, 2017 -  1:27 PM    Generic Name Brand Name Tablet Size Instructions for use    AmLODIPine Besylate (Tab) NORVASC 10 MG TAKE ONE TABLET BY MOUTH ONCE DAILY        BuPROPion HCl (TABLET SR 24 HR) WELLBUTRIN  MG TAKE ONE TABLET BY MOUTH IN THE MORNING        Citalopram Hydrobromide (Tab) CELEXA 20 MG Take 1 Tab by mouth every day.        Ergocalciferol (Cap) DRISDOL 77646 UNITS TAKE 1 CAPSULE BY MOUTH EVERY 7 DAYS        Estradiol (Tab) ESTRACE 0.5 MG Take 1 Tab by mouth every day.        Fluticasone Propionate (Suspension) FLONASE 50 MCG/ACT Spray 2 Sprays in nose every day.        Levothyroxine Sodium (Tab) SYNTHROID 125 MCG TAKE ONE TABLET BY MOUTH IN THE MORNING ON  AN  EMPTY  STOMACH         Loratadine (Cap) Loratadine 10 MG Take  by mouth every day.        Losartan Potassium-HCTZ (Tab) HYZAAR 100-25 MG TAKE ONE TABLET BY MOUTH ONCE DAILY        Multiple Vitamins-Minerals   Take  by mouth every day.        Progesterone Micronized (Cap) PROMETRIUM 100 MG Take 1 Cap by mouth every day.        .                 Medicines prescribed today were sent to:     [facility] [address] [facility], [place] - [address]    [address] [place] [address]    Phone: [phone] Fax: [phone]    Open 24 Hours?: No    [facility] [address] [facility] (), [place] - [address]    [address] () [place] [address]    Phone: [phone] Fax: [phone]    Open 24 Hours?: No      Medication refill instructions:       If your prescription bottle indicates you have medication refills left, it is not necessary to call your provider’s office. Please contact your pharmacy and they will refill your medication.    If your prescription bottle indicates you do not have any refills left, you may request refills at any time through one of the following ways: The online [employer] system (except Urgent Care), by calling your provider’s office, or by asking your pharmacy to contact your provider’s office with a refill request. Medication refills are processed only during regular business hours and may not be available until the next business day. Your provider may request additional information or to have a follow-up visit with you prior to refilling your medication.   *Please Note: Medication refills are assigned a new Rx number when refilled electronically. Your pharmacy may indicate that no refills were authorized even though a new prescription for the same medication is available at the pharmacy. Please request the medicine by name with the pharmacy before contacting your provider for a refill.           [employer] Access Code: Activation code not generated  Current [employer] Status: Active

## 2017-06-27 NOTE — PATIENT INSTRUCTIONS
1. Follow with PCP regularly  2. Continue CPAP nightly  3. Clean mask and tubing weekly  4. Follow-up in 6 months

## 2017-06-27 NOTE — PROGRESS NOTES
Chief Complaint   Patient presents with   • Follow-Up     6 week complience         HPI: This patient is a 51 y.o. female, who presents for follow-up of EMRE. Medical history includes HTN, HLD, prediabetes, former smoker 15 pack year history quitting 2 years ago.    In regards to EMRE, sleep symptoms include snoring, daytime hypersomnolence, weight gain, a.m. headache. PSG indicates mild to moderate EMRE with an AHI of 12.6, REM index 22.6, minimum oxygen saturation of 76 %. she is compliant with auto CPAP 8-15 cm H2O, she was initiated on this at her last visit in March. Compliance download over the past 30 days indicates 96.7 % compliance, average use of almost 6 hours per night, AHI of 1.6. Mask and pressures are comfortable. She is tolerating her machine. Her morning headaches have resolved. She still has some fatigue but feels she is sleeping more soundly at night. No significant changes to her health since she was last seen. She has no history of pulmonary or cardiac disease. She denies dyspnea, cough or wheeze.     Past Medical History   Diagnosis Date   • Heart burn    • Indigestion    • Snoring    • Arthritis      spine   • Anesthesia      nausea   • Other specified disorder of intestines      diarrhea and constipation   • GERD (gastroesophageal reflux disease)    • Thyroid disease    • Hypertension    • Acquired hypothyroidism 9/21/2016   • Basal cell carcinoma 9/21/2016   • Depression 9/21/2016   • Essential hypertension 9/21/2016   • Family history of melanoma 9/21/2016   • Hyperlipidemia 9/21/2016   • Prediabetes 9/21/2016   • Vitamin D deficiency 9/21/2016   • Viral meningitis    • Substance abuse      sober for 5 years   • Diabetes (CMS-HCC)    • Nasal drainage    • Scarlet fever        Social History   Substance Use Topics   • Smoking status: Former Smoker -- 0.50 packs/day for 15 years     Types: Cigarettes     Quit date: 09/21/2015   • Smokeless tobacco: Never Used   • Alcohol Use: No      Comment:  "Sober since 2011       Family History   Problem Relation Age of Onset   • Dementia Mother    • Cancer Father      melanoma       Current medications as of today   Current Outpatient Prescriptions   Medication Sig Dispense Refill   • vitamin D, Ergocalciferol, (DRISDOL) 68312 UNITS Cap capsule TAKE 1 CAPSULE BY MOUTH EVERY 7 DAYS 4 Cap 2   • amlodipine (NORVASC) 10 MG Tab TAKE ONE TABLET BY MOUTH ONCE DAILY 30 Tab 2   • buPROPion (WELLBUTRIN XL) 300 MG XL tablet TAKE ONE TABLET BY MOUTH IN THE MORNING 30 Tab 2   • levothyroxine (SYNTHROID) 125 MCG Tab TAKE ONE TABLET BY MOUTH IN THE MORNING ON  AN  EMPTY  STOMACH 30 Tab 2   • losartan-hydrochlorothiazide (HYZAAR) 100-25 MG per tablet TAKE ONE TABLET BY MOUTH ONCE DAILY 30 Tab 2   • citalopram (CELEXA) 20 MG Tab Take 1 Tab by mouth every day. 30 Tab 3   • progesterone (PROMETRIUM) 100 MG Cap Take 1 Cap by mouth every day. 30 Cap 3   • estradiol (ESTRACE) 0.5 MG tablet Take 1 Tab by mouth every day. 30 Tab 3   • fluticasone (FLONASE) 50 MCG/ACT nasal spray Spray 2 Sprays in nose every day. 16 g 1   • Loratadine (CLARITIN) 10 MG CAPS Take  by mouth every day.     • Multiple Vitamins-Minerals (MULTIVITAMIN PO) Take  by mouth every day.       No current facility-administered medications for this visit.       Allergies: Review of patient's allergies indicates no known allergies.    Blood pressure 132/65, pulse 87, temperature 36.6 °C (97.9 °F), resp. rate 15, height 1.651 m (5' 5\"), weight 102.649 kg (226 lb 4.8 oz), SpO2 93 %.      ROS:   Constitutional: Denies fevers, chills, night sweats, weight loss or fatigue  HEENT: Denies earache, difficulty hearing, tinnitus, nasal congestion, hoarseness  Cardiovascular: Denies chest pain, tightness, palpitations, orthopnea or edema  Respiratory: Denies cough, wheeze, dyspnea, hemoptysis  Sleep: See HPI  GI: Denies heartburn, dysphagia, nausea, abdominal pain, diarrhea or constipation  : Denies frequent urination, hematuria, " discharge or painful urination  Musculoskeletal: Denies back pain, painful joints, sore muscles  Neurological: Denies weakness or headaches  Skin: No rashes    Physical exam:   Appearance: Well-nourished, well-developed, in no acute distress  HEENT: Normocephalic, atraumatic, white sclera, PERRLA  Respiratory: no intercostal retractions or accessory muscle use   Lungs auscultation: Clear to auscultation bilaterally  Cardiovascular: Regular rate rhythm no murmurs, rubs or gallops  Gait: Normal  Digits: No clubbing, cyanosis  Motor: No focal deficits  Orientation: Oriented to time, person and place    Diagnosis:  1. Essential hypertension     2. Acquired hypothyroidism     3. EMRE (obstructive sleep apnea)     4. Adult BMI 36.0-36.9 kg/sq m         Plan:  1. Follow with PCP regularly  2. Continue CPAP nightly  3. Clean mask and tubing weekly  4. Follow-up in 6 months

## 2017-07-07 RX ORDER — CITALOPRAM 20 MG/1
TABLET ORAL
Qty: 30 TAB | Refills: 3 | Status: SHIPPED | OUTPATIENT
Start: 2017-07-07 | End: 2017-10-29 | Stop reason: SDUPTHER

## 2017-08-08 RX ORDER — FLUTICASONE PROPIONATE 50 MCG
SPRAY, SUSPENSION (ML) NASAL
Qty: 16 G | Refills: 3 | Status: SHIPPED | OUTPATIENT
Start: 2017-08-08 | End: 2021-01-19 | Stop reason: SDUPTHER

## 2017-08-23 ENCOUNTER — OFFICE VISIT (OUTPATIENT)
Dept: MEDICAL GROUP | Facility: MEDICAL CENTER | Age: 52
End: 2017-08-23
Payer: COMMERCIAL

## 2017-08-23 VITALS
WEIGHT: 232 LBS | SYSTOLIC BLOOD PRESSURE: 112 MMHG | BODY MASS INDEX: 38.65 KG/M2 | DIASTOLIC BLOOD PRESSURE: 72 MMHG | OXYGEN SATURATION: 93 % | HEIGHT: 65 IN | TEMPERATURE: 97.9 F | HEART RATE: 79 BPM

## 2017-08-23 DIAGNOSIS — G47.33 OSA (OBSTRUCTIVE SLEEP APNEA): ICD-10-CM

## 2017-08-23 DIAGNOSIS — M25.561 ACUTE PAIN OF RIGHT KNEE: ICD-10-CM

## 2017-08-23 DIAGNOSIS — C44.91 BASAL CELL CARCINOMA: ICD-10-CM

## 2017-08-23 DIAGNOSIS — M25.522 LEFT ELBOW PAIN: ICD-10-CM

## 2017-08-23 DIAGNOSIS — E03.9 ACQUIRED HYPOTHYROIDISM: ICD-10-CM

## 2017-08-23 PROCEDURE — 99214 OFFICE O/P EST MOD 30 MIN: CPT | Performed by: FAMILY MEDICINE

## 2017-08-23 NOTE — MR AVS SNAPSHOT
"        Alexandra Aden Aleja   2017 4:40 PM   Office Visit   MRN: [id number]    Department:  [facility]   Dept Phone:  [phone]    Description:  Female : 1965   Provider:  Esha Browne M.D.           Reason for Visit     Knee Pain right side    Elbow Pain left side      Allergies as of 2017     No Known Allergies      You were diagnosed with     Acute pain of right knee   [2551294]       Left elbow pain   [412889]       EMRE (obstructive sleep apnea)   [233115]       Acquired hypothyroidism   [5239039]         Vital Signs     Blood Pressure Pulse Temperature Height Weight Body Mass Index    112/72 mmHg 79 36.6 °C (97.9 °F) 1.651 m (5' 5\") 105.235 kg (232 lb) 38.61 kg/m2    Oxygen Saturation Smoking Status                93% Former Smoker          Basic Information     Date Of Birth Sex Race Ethnicity Preferred Language    1965 Female White Non- English      Your appointments     Dec 28, 2017  3:40 PM   Follow UP with ANASTASIIA Aguila   [facility] (--)    [address] [facility] [address]   [phone]              Problem List              ICD-10-CM Priority Class Noted - Resolved    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   2013 - Present    Essential hypertension I10   2016 - Present    Acquired hypothyroidism E03.9   2016 - Present    Depression F32.9   2016 - Present    Vitamin D deficiency E55.9   2016 - Present    Hyperlipidemia E78.5   2016 - Present    Prediabetes R73.03   2016 - Present    Weight gain R63.5   2016 - Present    Snoring R06.83   2016 - Present    Daytime somnolence R40.0   2016 - Present    Adult BMI 36.0-36.9 kg/sq m Z68.36   2016 - Present    Basal cell carcinoma C44.91   2016 - Present    Family history of melanoma Z80.8   2016 - Present    Right acute suppurative otitis media H66.001   2017 - " Present    EMRE (obstructive sleep apnea) G47.33   6/27/2017 - Present    Acute pain of right knee M25.561   8/23/2017 - Present    Left elbow pain M25.522   8/23/2017 - Present      Health Maintenance        Date Due Completion Dates    MAMMOGRAM 8/25/2003 ---    IMM INFLUENZA (1) 9/1/2017 11/8/2016, 11/8/2016    PAP SMEAR 11/1/2018 11/1/2015 (Done)    Override on 11/1/2015: Done    COLONOSCOPY 1/15/2021 1/15/2016 (Done), 11/1/2015 (Done)    Override on 1/15/2016: Done    Override on 11/1/2015: Done    IMM DTaP/Tdap/Td Vaccine (2 - Td) 11/3/2025 11/3/2015            Current Immunizations     Influenza Vaccine Quad Inj (Pf) 11/8/2016  1:24 PM    Influenza Vaccine Quad Inj (Preserved) 11/8/2016    Tdap Vaccine 11/3/2015      Below and/or attached are the medications your provider expects you to take. Review all of your home medications and newly ordered medications with your provider and/or pharmacist. Follow medication instructions as directed by your provider and/or pharmacist. Please keep your medication list with you and share with your provider. Update the information when medications are discontinued, doses are changed, or new medications (including over-the-counter products) are added; and carry medication information at all times in the event of emergency situations     Allergies:  No Known Allergies          Medications  Valid as of: August 23, 2017 -  5:15 PM    Generic Name Brand Name Tablet Size Instructions for use    AmLODIPine Besylate (Tab) NORVASC 10 MG TAKE ONE TABLET BY MOUTH ONCE DAILY        BuPROPion HCl (TABLET SR 24 HR) WELLBUTRIN  MG TAKE ONE TABLET BY MOUTH IN THE MORNING        Citalopram Hydrobromide (Tab) CELEXA 20 MG TAKE ONE TABLET BY MOUTH ONCE DAILY        Diclofenac Sodium (Gel) Diclofenac Sodium 1 % Apply thin film affected area q 8 hours prn pain        Ergocalciferol (Cap) DRISDOL 75241 units TAKE 1 CAPSULE BY MOUTH EVERY 7 DAYS        Estradiol (Tab) ESTRACE 0.5 MG Take 1  Tab by mouth every day.        Fluticasone Propionate (Suspension) FLONASE 50 MCG/ACT USE TWO SPRAY(S) IN EACH NOSTRIL ONCE DAILY        Levothyroxine Sodium (Tab) SYNTHROID 125 MCG TAKE ONE TABLET BY MOUTH IN THE MORNING ON  AN  EMPTY  STOMACH        Loratadine (Cap) Loratadine 10 MG Take  by mouth every day.        Losartan Potassium-HCTZ (Tab) HYZAAR 100-25 MG TAKE ONE TABLET BY MOUTH ONCE DAILY        Multiple Vitamins-Minerals   Take  by mouth every day.        Progesterone Micronized (Cap) PROMETRIUM 100 MG Take 1 Cap by mouth every day.        .                 Medicines prescribed today were sent to:     [facility] [address] [place] [address], [place] - [address]    [address] [facility] [place] [address]    Phone: [phone] Fax: [phone]    Open 24 Hours?: No    [facility] [address] [place] [address] (), [place] - [address]    [address] () [place] [address]    Phone: [phone] Fax: [phone]    Open 24 Hours?: No      Medication refill instructions:       If your prescription bottle indicates you have medication refills left, it is not necessary to call your provider’s office. Please contact your pharmacy and they will refill your medication.    If your prescription bottle indicates you do not have any refills left, you may request refills at any time through one of the following ways: The online [employer] system (except Urgent Care), by calling your provider’s office, or by asking your pharmacy to contact your provider’s office with a refill request. Medication refills are processed only during regular business hours and may not be available until the next business day. Your provider may request additional information or to have a follow-up visit with you prior to refilling your medication.   *Please Note: Medication refills are assigned a new Rx number when refilled electronically. Your pharmacy may indicate that no refills were authorized even though a new prescription for the same  medication is available at the pharmacy. Please request the medicine by name with the pharmacy before contacting your provider for a refill.        Your To Do List     Future Labs/Procedures Complete By Expires    FREE THYROXINE  As directed 8/24/2018    TRIIDOTHYRONINE  As directed 8/23/2018    TSH  As directed 8/24/2018      Referral     A referral request has been sent to our patient care coordination department. Please allow 3-5 business days for us to process this request and contact you either by phone or mail. If you do not hear from us by the 5th business day, please call us at [phone].           [employer] Access Code: Activation code not generated  Current [employer] Status: Active

## 2017-08-23 NOTE — ASSESSMENT & PLAN NOTE
Started hurting several months ago but worsened 2 weeks ago.  Started [employer] 6 weeks ago.  No swelling.  Extension and flexion makr it worse.  Once she starts walking it improves.  Worse at night

## 2017-08-23 NOTE — ASSESSMENT & PLAN NOTE
Pain in the inside of her elbow for the last year.  Worsens when she staightens it.  She denies any trauma to the area. It does not change when she twists it.

## 2017-08-24 ENCOUNTER — HOSPITAL ENCOUNTER (OUTPATIENT)
Dept: LAB | Facility: MEDICAL CENTER | Age: 52
End: 2017-08-24
Attending: FAMILY MEDICINE
Payer: COMMERCIAL

## 2017-08-24 DIAGNOSIS — E03.9 ACQUIRED HYPOTHYROIDISM: ICD-10-CM

## 2017-08-24 LAB
T3 SERPL-MCNC: 101.5 NG/DL (ref 60–181)
T4 FREE SERPL-MCNC: 0.89 NG/DL (ref 0.53–1.43)
TSH SERPL DL<=0.005 MIU/L-ACNC: 1.9 UIU/ML (ref 0.3–3.7)

## 2017-08-24 PROCEDURE — 84443 ASSAY THYROID STIM HORMONE: CPT

## 2017-08-24 PROCEDURE — 84480 ASSAY TRIIODOTHYRONINE (T3): CPT

## 2017-08-24 PROCEDURE — 84439 ASSAY OF FREE THYROXINE: CPT

## 2017-08-24 PROCEDURE — 36415 COLL VENOUS BLD VENIPUNCTURE: CPT

## 2017-08-29 NOTE — ASSESSMENT & PLAN NOTE
Patient has a history of a basal cell carcinoma that is being treated by Dr. Villavicencio.  She would like a referral to follow up with that office.

## 2017-08-29 NOTE — ASSESSMENT & PLAN NOTE
Stable. Currently taking levothyroxine 125 mcg daily as prescribed.  Denies palpitations, skin changes, temperature intolerance, or changes in bowel habits

## 2017-08-29 NOTE — PROGRESS NOTES
Subjective:     Chief Complaint   Patient presents with   • Knee Pain     right side   • Elbow Pain     left side       Alexandra Masterson is a 52 y.o. female here today for evaluation and management of:    Acute pain of right knee  Started hurting several months ago but worsened 2 weeks ago.  Started [employer] 6 weeks ago.  No swelling.  Extension and flexion makr it worse.  Once she starts walking it improves.  Worse at night    Left elbow pain  Pain in the inside of her elbow for the last year.  Worsens when she staightens it.  She denies any trauma to the area. It does not change when she twists it.      EMRE (obstructive sleep apnea)  Using CPAP which is helping with her fatigue.    Basal cell carcinoma  Patient has a history of a basal cell carcinoma that is being treated by Dr. Villavicencio.  She would like a referral to follow up with that office.    Acquired hypothyroidism  Stable. Currently taking levothyroxine 125 mcg daily as prescribed.  Denies palpitations, skin changes, temperature intolerance, or changes in bowel habits           No Known Allergies    Current medicines (including changes today)  Current Outpatient Prescriptions   Medication Sig Dispense Refill   • Diclofenac Sodium 1 % Gel Apply thin film affected area q 8 hours prn pain 80 g 2   • fluticasone (FLONASE) 50 MCG/ACT nasal spray USE TWO SPRAY(S) IN EACH NOSTRIL ONCE DAILY 16 g 3   • citalopram (CELEXA) 20 MG Tab TAKE ONE TABLET BY MOUTH ONCE DAILY 30 Tab 3   • vitamin D, Ergocalciferol, (DRISDOL) 00350 UNITS Cap capsule TAKE 1 CAPSULE BY MOUTH EVERY 7 DAYS 4 Cap 2   • amlodipine (NORVASC) 10 MG Tab TAKE ONE TABLET BY MOUTH ONCE DAILY 30 Tab 2   • buPROPion (WELLBUTRIN XL) 300 MG XL tablet TAKE ONE TABLET BY MOUTH IN THE MORNING 30 Tab 2   • levothyroxine (SYNTHROID) 125 MCG Tab TAKE ONE TABLET BY MOUTH IN THE MORNING ON  AN  EMPTY  STOMACH 30 Tab 2   • losartan-hydrochlorothiazide (HYZAAR) 100-25 MG per tablet TAKE ONE TABLET BY MOUTH  "ONCE DAILY 30 Tab 2   • progesterone (PROMETRIUM) 100 MG Cap Take 1 Cap by mouth every day. 30 Cap 3   • estradiol (ESTRACE) 0.5 MG tablet Take 1 Tab by mouth every day. 30 Tab 3   • Loratadine (CLARITIN) 10 MG CAPS Take  by mouth every day.     • Multiple Vitamins-Minerals (MULTIVITAMIN PO) Take  by mouth every day.       No current facility-administered medications for this visit.        She  has a past medical history of Acquired hypothyroidism (9/21/2016); Anesthesia; Arthritis; Basal cell carcinoma (9/21/2016); Depression (9/21/2016); Diabetes (CMS-HCC); Essential hypertension (9/21/2016); Family history of melanoma (9/21/2016); GERD (gastroesophageal reflux disease); Heart burn; Hyperlipidemia (9/21/2016); Hypertension; Indigestion; Nasal drainage; Other specified disorder of intestines; Prediabetes (9/21/2016); Scarlet fever; Snoring; Substance abuse; Thyroid disease; Viral meningitis; and Vitamin D deficiency (9/21/2016).    Patient Active Problem List    Diagnosis Date Noted   • Acute pain of right knee 08/23/2017   • Left elbow pain 08/23/2017   • EMRE (obstructive sleep apnea) 06/27/2017   • Right acute suppurative otitis media 01/06/2017   • Essential hypertension 09/21/2016   • Acquired hypothyroidism 09/21/2016   • Depression 09/21/2016   • Vitamin D deficiency 09/21/2016   • Hyperlipidemia 09/21/2016   • Prediabetes 09/21/2016   • Weight gain 09/21/2016   • Snoring 09/21/2016   • Daytime somnolence 09/21/2016   • Adult BMI 36.0-36.9 kg/sq m 09/21/2016   • Basal cell carcinoma 09/21/2016   • Family history of melanoma 09/21/2016   • 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  No nausea or vomiting.  No chest pain or palpitations.  No cough or SOB.  No pain with urination or hematuria.  No black or bloody stools.       Objective:     Blood pressure 112/72, pulse 79, temperature 36.6 °C (97.9 °F), height 1.651 m (5' 5\"), weight 105.2 kg (232 lb), " SpO2 93 %. Body mass index is 38.61 kg/m².   Physical Exam:  Well developed, well nourished.  Alert, oriented in no acute distress.  Eye contact is good, speech goal directed, affect calm  Eyes: conjunctiva non-injected, sclera non-icteric.  Neck Supple.  No adenopathy or masses in the neck or supraclavicular regions. No thyromegaly  Lungs: clear to auscultation bilaterally with good excursion. No wheezes or rhonchi  CV: regular rate and rhythm. No murmur  Abdomen: soft, nontender, no masses or organomegaly.  No rebound or guarding  Skin: Solar damage is present. Wound is healing well without erythema or induration  Knees: No erythema/edema/ecchymosis/effusion. Tenderness to palpation on lateral edge of patella.  Joint line tenderness bilaterally. Patellar grind test positive. Lachman's negative negative. Missy's negative. ROM intact. 5/5 strength bilaterally. 2+ deep tendon reflexes bilaterally.  Elbow: No deformity, swelling or bruising noted. Tenderness to palpation - none. Full range of motion bilaterally. 2+ biceps, triceps and brachioradialis deep tendon reflexes bilaterally. 5/5 strength bilaterally.            Assessment and Plan:   The following treatment plan was discussed    1. Acute pain of right knee  Diclofenac gel. Refer to orthopedics  - REFERRAL TO ORTHOPEDICS  - Diclofenac Sodium 1 % Gel; Apply thin film affected area q 8 hours prn pain  Dispense: 80 g; Refill: 2    2. Left elbow pain  Refer to aorta  - REFERRAL TO ORTHOPEDICS  - Diclofenac Sodium 1 % Gel; Apply thin film affected area q 8 hours prn pain  Dispense: 80 g; Refill: 2    3. EMRE (obstructive sleep apnea)  Continue CPAP    4. Acquired hypothyroidism  Check labs and adjust dose as needed  - TSH; Future  - FREE THYROXINE; Future  - TRIIDOTHYRONINE; Future    5. Basal cell carcinoma  Continue follow-up with dermatology.    Any change or worsening of signs or symptoms, patient encouraged to follow-up or report to the emergency room for  further evaluation. Patient understands and agrees.    Followup: Return if symptoms worsen or fail to improve.

## 2017-09-26 ENCOUNTER — HOSPITAL ENCOUNTER (OUTPATIENT)
Dept: LAB | Facility: MEDICAL CENTER | Age: 52
End: 2017-09-26
Payer: COMMERCIAL

## 2017-09-26 LAB
BDY FAT % MEASURED: 44.1 %
BP DIAS: 56 MMHG
BP SYS: 113 MMHG
CHOLEST SERPL-MCNC: 184 MG/DL (ref 100–199)
DIABETES HTDIA: NO
EVENT NAME HTEVT: NORMAL
FASTING HTFAS: YES
GLUCOSE SERPL-MCNC: 103 MG/DL (ref 65–99)
HDLC SERPL-MCNC: 42 MG/DL
HYPERTENSION HTHYP: YES
LDLC SERPL CALC-MCNC: 106 MG/DL
SCREENING LOC CITY HTCIT: NORMAL
SCREENING LOC STATE HTSTA: NORMAL
SCREENING LOCATION HTLOC: NORMAL
SMOKING HTSMO: NO
SUBSCRIBER ID HTSID: NORMAL
TRIGL SERPL-MCNC: 181 MG/DL (ref 0–149)

## 2017-09-26 PROCEDURE — 36415 COLL VENOUS BLD VENIPUNCTURE: CPT

## 2017-09-26 PROCEDURE — S5190 WELLNESS ASSESSMENT BY NONPH: HCPCS

## 2017-09-26 PROCEDURE — 82947 ASSAY GLUCOSE BLOOD QUANT: CPT

## 2017-09-26 PROCEDURE — 80061 LIPID PANEL: CPT

## 2017-09-29 RX ORDER — AMLODIPINE BESYLATE 10 MG/1
TABLET ORAL
Qty: 30 TAB | Refills: 6 | Status: SHIPPED | OUTPATIENT
Start: 2017-09-29 | End: 2018-04-23 | Stop reason: SDUPTHER

## 2017-09-29 RX ORDER — ERGOCALCIFEROL 1.25 MG/1
CAPSULE ORAL
Qty: 4 CAP | Refills: 6 | Status: SHIPPED | OUTPATIENT
Start: 2017-09-29 | End: 2018-05-15 | Stop reason: SDUPTHER

## 2017-09-29 RX ORDER — LOSARTAN POTASSIUM AND HYDROCHLOROTHIAZIDE 25; 100 MG/1; MG/1
TABLET ORAL
Qty: 30 TAB | Refills: 6 | Status: SHIPPED | OUTPATIENT
Start: 2017-09-29 | End: 2018-04-23 | Stop reason: SDUPTHER

## 2017-09-29 RX ORDER — LEVOTHYROXINE SODIUM 0.12 MG/1
TABLET ORAL
Qty: 30 TAB | Refills: 6 | Status: SHIPPED | OUTPATIENT
Start: 2017-09-29 | End: 2018-04-23 | Stop reason: SDUPTHER

## 2017-09-29 RX ORDER — BUPROPION HYDROCHLORIDE 300 MG/1
TABLET ORAL
Qty: 30 TAB | Refills: 6 | Status: SHIPPED | OUTPATIENT
Start: 2017-09-29 | End: 2018-04-23 | Stop reason: SDUPTHER

## 2017-10-05 ENCOUNTER — APPOINTMENT (OUTPATIENT)
Dept: SOCIAL WORK | Facility: CLINIC | Age: 52
End: 2017-10-05

## 2017-10-05 PROCEDURE — 90686 IIV4 VACC NO PRSV 0.5 ML IM: CPT | Performed by: REGISTERED NURSE

## 2017-11-07 DIAGNOSIS — E78.49 OTHER HYPERLIPIDEMIA: ICD-10-CM

## 2017-11-07 DIAGNOSIS — R73.03 PREDIABETES: ICD-10-CM

## 2017-11-07 DIAGNOSIS — I10 ESSENTIAL HYPERTENSION: ICD-10-CM

## 2017-11-07 DIAGNOSIS — G47.33 OSA (OBSTRUCTIVE SLEEP APNEA): ICD-10-CM

## 2017-11-29 ENCOUNTER — HOSPITAL ENCOUNTER (OUTPATIENT)
Dept: RADIOLOGY | Facility: MEDICAL CENTER | Age: 52
End: 2017-11-29

## 2017-11-30 ENCOUNTER — HOSPITAL ENCOUNTER (OUTPATIENT)
Dept: RADIOLOGY | Facility: MEDICAL CENTER | Age: 52
End: 2017-11-30
Attending: FAMILY MEDICINE
Payer: COMMERCIAL

## 2017-11-30 DIAGNOSIS — Z12.31 ENCOUNTER FOR SCREENING MAMMOGRAM FOR MALIGNANT NEOPLASM OF BREAST: ICD-10-CM

## 2017-11-30 PROCEDURE — G0202 SCR MAMMO BI INCL CAD: HCPCS

## 2017-12-12 ENCOUNTER — OFFICE VISIT (OUTPATIENT)
Dept: HEALTH INFORMATION MANAGEMENT | Facility: MEDICAL CENTER | Age: 52
End: 2017-12-12
Payer: COMMERCIAL

## 2017-12-12 VITALS
OXYGEN SATURATION: 96 % | HEIGHT: 65 IN | DIASTOLIC BLOOD PRESSURE: 78 MMHG | HEART RATE: 78 BPM | BODY MASS INDEX: 38.05 KG/M2 | WEIGHT: 228.4 LBS | SYSTOLIC BLOOD PRESSURE: 112 MMHG | TEMPERATURE: 98.8 F

## 2017-12-12 DIAGNOSIS — E55.9 VITAMIN D DEFICIENCY: ICD-10-CM

## 2017-12-12 DIAGNOSIS — R06.83 SNORING: ICD-10-CM

## 2017-12-12 DIAGNOSIS — E03.9 ACQUIRED HYPOTHYROIDISM: ICD-10-CM

## 2017-12-12 DIAGNOSIS — R63.2 BINGE EATING: ICD-10-CM

## 2017-12-12 DIAGNOSIS — R40.0 DAYTIME SOMNOLENCE: ICD-10-CM

## 2017-12-12 DIAGNOSIS — I10 ESSENTIAL HYPERTENSION: ICD-10-CM

## 2017-12-12 DIAGNOSIS — E78.49 OTHER HYPERLIPIDEMIA: ICD-10-CM

## 2017-12-12 DIAGNOSIS — R73.03 PREDIABETES: ICD-10-CM

## 2017-12-12 DIAGNOSIS — R63.5 WEIGHT GAIN, ABNORMAL: ICD-10-CM

## 2017-12-12 DIAGNOSIS — G47.33 OSA (OBSTRUCTIVE SLEEP APNEA): ICD-10-CM

## 2017-12-12 PROCEDURE — 97802 MEDICAL NUTRITION INDIV IN: CPT | Performed by: DIETITIAN, REGISTERED

## 2017-12-12 PROCEDURE — 99204 OFFICE O/P NEW MOD 45 MIN: CPT | Performed by: INTERNAL MEDICINE

## 2017-12-12 PROCEDURE — 93000 ELECTROCARDIOGRAM COMPLETE: CPT | Performed by: INTERNAL MEDICINE

## 2017-12-12 ASSESSMENT — PAIN SCALES - GENERAL: PAINLEVEL: NO PAIN

## 2017-12-12 NOTE — PATIENT INSTRUCTIONS
2 meal replacements am and lunch  Low carb high protein snacks and dinner  Continue exercise as you are doing 3-4 days per week [employer]  Consider adding weight loss medication next visit  Consider referral to behavioral health (maybe outside [facility]) for binging

## 2017-12-13 NOTE — PROGRESS NOTES
Bariatric Medicine H&P  Chief Complaint   Patient presents with   • Weight Gain       Referred by:  Esha Browne M.D.    History of Present Illness:   Alexandra Masterson is a 52 y.o.  female who presents for weight management and to help address co-morbidities related to overweight, including obstructive sleep apnea, hypertension, acquired hypothyroidism, depression, vitamin D deficiency, hyperlipidemia, prediabetes, daytime somnolence.    The patient has steadily gained weight over time. About 5 years ago, she lost 40 pounds and was diagnosed with hyperthyroidism. She had not been trying to lose weight. After treatment for her hyperthyroidism, her weight has steadily increased.    The patient wonders why she is gaining weight now, in her early 50s. She is also concerned about ongoing hyperlipidemia, prediabetes. She wonders if her thyroid is playing a role in her decreased metabolism.    Typical meals include cereal or toast with peanut butter for breakfast, sandwich salad or fast food for lunch, meat vegetables and some starch for dinner. She snacks on cheese, crackers, popcorn during the day. She mostly drinks water and one cup of coffee.    Behavior-Related History:  Binge eating screen: Positive  Has eaten a whole cake, and gone to the store to buy another cake so her family would not know that she ate the first cake. Did end up telling her family however.     Exercise:   [employer] 3-4 times per week.    Life Style Changes:  Sits mostly at her desk. No other recent, major changes.     Review of Systems   Positive for eye irritation, nasal congestion at times, muscle cramps. Wonders if she has gluten intolerance, but is eating a fair amount of bakery goods daily.  Sleep apnea screen: Positive, on CPAP  All other ROS were reviewed with patient today and are negative.      PMH/PSH:  I have reviewed the patient's medical, social and family history, allergies, and medications  "today.  Prior records reviewed.  Personal Hx of Bariatric Surgery: No  Works in behavioral health at [email or web address]    Physical Exam:   Encounter Vitals  Temperature: 37.1 °C (98.8 °F)  Blood Pressure: 112/78  Pulse: 78  Pulse Oximetry: 96 %  Weight: 103.6 kg (228 lb 6.4 oz)  Height: 165.1 cm (5' 5\")  Waist: 45 in  BMI (Calculated): 38.01  Pain Score: No pain  Body fat % 48.4  REE 1687 kcal/day    Constitutional: Oriented to person, place, and time and well-developed, well-nourished, and in no distress.    HENT: No facial plethora.  No Cushingoid features.  No scalloped tongue.  No dental erosions.  No swollen parotids.  Head: Normocephalic.   Eyes: EOM are normal. Pupils are equal, round, and reactive to light. No periorbital edema.  No lateral thinning of eyebrows.  No vertical nystagmus.  Neck: Normal range of motion. Neck supple. No thyromegaly palpated. No buffalo hump.  Cardiovascular: Normal rate and regular rhythm.    No murmur heard.  Pulmonary/Chest: Effort normal and breath sounds normal. No wheezes.   Abdominal: Soft. Bowel sounds are normal. No pannus.  No ascites.  No hepatosplenomegaly.  No red striae.  Musculoskeletal: Normal range of motion. No edema.   Neurological: Alert and oriented to person, place, and time. Normal reflexes. No cranial nerve deficit. No muscle weakness.  Gait normal.   Skin: Warm and dry. Not diaphoretic. No hirsuitism.  No acanthosis nigricans.  Not excessively dry, scaly.  No acne.  No bruising/ecchymosis.  No hyperpigmentation.  No xanthomas or acrochordon.  No violaceous striae.  No keratosis pilaris.  Psychiatric: Mood, memory, affect and judgment normal.     Laboratory:   Prior labs reviewed. None new.  EKG: Normal sinus rhythm, no ST-T change, rate 67, corrected QT 0.43  Ordered and reviewed by me today.      Dietitian Assessment: I have reviewed the Dietitian's assessment related to this encounter.       ASSESSMENT/PLAN:  Body mass index is 38.01 kg/m².   Obesity Stage " ([place]) 2; Class 2    1. Weight gain, abnormal  CBC WITHOUT DIFFERENTIAL    COMP METABOLIC PANEL    HEMOGLOBIN A1C    THYROID PANEL    VITAMIN 1,25 DIHYDROXY   2. Acquired hypothyroidism  CBC WITHOUT DIFFERENTIAL    COMP METABOLIC PANEL    HEMOGLOBIN A1C    THYROID PANEL    VITAMIN 1,25 DIHYDROXY   3. Essential hypertension  CBC WITHOUT DIFFERENTIAL    COMP METABOLIC PANEL    HEMOGLOBIN A1C    THYROID PANEL    VITAMIN 1,25 DIHYDROXY   4. Vitamin D deficiency  CBC WITHOUT DIFFERENTIAL    COMP METABOLIC PANEL    HEMOGLOBIN A1C    THYROID PANEL    VITAMIN 1,25 DIHYDROXY   5. Other hyperlipidemia  CBC WITHOUT DIFFERENTIAL    COMP METABOLIC PANEL    HEMOGLOBIN A1C    THYROID PANEL    VITAMIN 1,25 DIHYDROXY   6. Prediabetes  CBC WITHOUT DIFFERENTIAL    COMP METABOLIC PANEL    HEMOGLOBIN A1C    THYROID PANEL    VITAMIN 1,25 DIHYDROXY   7. Snoring  CBC WITHOUT DIFFERENTIAL    COMP METABOLIC PANEL    HEMOGLOBIN A1C    THYROID PANEL    VITAMIN 1,25 DIHYDROXY   8. Adult BMI 36.0-36.9 kg/sq m  CBC WITHOUT DIFFERENTIAL    COMP METABOLIC PANEL    HEMOGLOBIN A1C    THYROID PANEL    VITAMIN 1,25 DIHYDROXY   9. Daytime somnolence  CBC WITHOUT DIFFERENTIAL    COMP METABOLIC PANEL    HEMOGLOBIN A1C    THYROID PANEL    VITAMIN 1,25 DIHYDROXY   10. EMRE (obstructive sleep apnea)  CBC WITHOUT DIFFERENTIAL    COMP METABOLIC PANEL    HEMOGLOBIN A1C    THYROID PANEL    VITAMIN 1,25 DIHYDROXY   11. Binge eating       The patient has significant comorbidities related to her obesity. She is also binge eating at times. I'm concerned about her prediabetes and hyperlipidemia increasing her risk factors for coronary artery disease. We will check her thyroid function to ensure this is not contributing to her weight gain. We will check labs to evaluate her current vitamin D levels lipid levels, blood glucose levels. I suspect her weight gain is primarily reduced metabolism due to age and perimenopausal status, with eating significant refined carbs  throughout the day. Her obstructive sleep apnea is being treated currently.      The patient and I have discussed at length and agree to the following recommendations, which are all addressing the above diagnoses:    Weight Goal: 5% wt loss at one month after start (pt goal weight is 170 lb)  Diet: 2 meal replacements per day, rest low carb  Physical Activity: Continue [employer] 3 times weekly  Risk level for moderate/vigorous exercise program: Low  New Rx: Consider weight loss medication, pending course at next visit  Did discuss weight loss medications today.  Side Effects: Will review consent if applicable.  Behavior change: Significant stimulus narrowing  [facility], given patient works in behavioral health.  Follow-up: 3 weeks    Face to face time spent 45 minutes,  with >50% of time devoted to one on one counseling on weight management issues, as documented above.      Thank you for your referral!

## 2017-12-13 NOTE — PATIENT INSTRUCTIONS
2 meal replacements daily (breakfast and lunch) and low carb, high protein meal for dinner.  Consume high protein snacks with non-starchy vegetables as needed.  Continue exercising as tolerated.  Come back before you run out of meal replacements to purchase more of them before your next appointment.

## 2017-12-13 NOTE — PROGRESS NOTES
"12/12/2017 52 y.o.   Referring Provider: Esha Browne M.D.       Time in/out: 1600 - 1626    Anthropometrics/Objective  Today's weight: 228.4#  Height: 65\"  BMI: 38.01  Stated Goal Weight: 170#  See comprehensive patient history form for further information     Subjective:  Pt is here today for the initial screening visit for the medical weight management program.   -Wants to lose weight  -Has done a ketogenic diet in the past and liked it - states she felt great on it  -Eats fast food out of convenience multiple times each week  -Hx of binge eating  -Exercises on 3-4 days/week and really likes what she is doing    See Medical Questionnaire for more detailed diet history     Nutrition Diagnosis (PES Statement)  · Obese related to excessive energy intake and inadequate energy expenditure as evidenced by BMI 38.       Biochemical data, medical test and procedures  Lab Results   Component Value Date/Time    CHOLSTRLTOT 184 09/26/2017 08:56 AM     (H) 09/26/2017 08:56 AM    HDL 42 09/26/2017 08:56 AM    TRIGLYCERIDE 181 (H) 09/26/2017 08:56 AM         Nutrition Intervention  Nutrition Prescription  Recommended Daily Kcals: 1600 Kcal based on REE      Meal Plan Recommendation   Recommend 2 meal replacements with 1 grocery meals and 2 snacks  per day    Comprehensive Nutrition Education Instruction or training leading to in-depth nutrition related knowledge about:  Meal timing and spacing, Metabolism of carb, protein, fat, Physical activity/exercise, Portion control and Handouts provided regarding topics discussed     Monitoring & Evaluation Plan  Behavioral-Environmental:  Behavior: Keep a food journal and bring to next appointment   Physical activity: Continue as tolerated    Food / Nutrient Intake:  Food intake: Follow meal plan as discussed (see above). Avoid concentrated sweets and processed carbs.   Fluid intake: Consume at least 64 oz water per day. Avoid all unsweetened beverages. Limit coffee to 1 cup a " day (ideally no cream or sugar). Avoid alcohol.     Physical Signs / Symptoms:  Weight change:  Weight loss to goal    Assessment Notes:  Alexandra is starting out with two meal replacements daily for breakfast and lunch and will eat a high protein, low CHO meal for dinner.  She understands that she can consume non-starchy vegetables at all snacks and with meals and will be careful with how much fat she adds to the meals.  She likes being in ketosis and we know she will lose weight while in ketosis so the challenge will be with changing her habits adequately enough to help with weight maintenance.  We will start looking forward to help her identify reasons she binges, foods she binges on, etc at our next appointment.      Follow up 1 month

## 2017-12-18 ENCOUNTER — HOSPITAL ENCOUNTER (OUTPATIENT)
Dept: LAB | Facility: MEDICAL CENTER | Age: 52
End: 2017-12-18
Attending: INTERNAL MEDICINE
Payer: COMMERCIAL

## 2017-12-18 DIAGNOSIS — E78.49 OTHER HYPERLIPIDEMIA: ICD-10-CM

## 2017-12-18 DIAGNOSIS — R06.83 SNORING: ICD-10-CM

## 2017-12-18 DIAGNOSIS — R63.5 WEIGHT GAIN, ABNORMAL: ICD-10-CM

## 2017-12-18 DIAGNOSIS — E55.9 VITAMIN D DEFICIENCY: ICD-10-CM

## 2017-12-18 DIAGNOSIS — E03.9 ACQUIRED HYPOTHYROIDISM: ICD-10-CM

## 2017-12-18 DIAGNOSIS — I10 ESSENTIAL HYPERTENSION: ICD-10-CM

## 2017-12-18 DIAGNOSIS — R73.03 PREDIABETES: ICD-10-CM

## 2017-12-18 DIAGNOSIS — G47.33 OSA (OBSTRUCTIVE SLEEP APNEA): ICD-10-CM

## 2017-12-18 DIAGNOSIS — R40.0 DAYTIME SOMNOLENCE: ICD-10-CM

## 2017-12-18 LAB
ALBUMIN SERPL BCP-MCNC: 4.4 G/DL (ref 3.2–4.9)
ALBUMIN/GLOB SERPL: 2 G/DL
ALP SERPL-CCNC: 52 U/L (ref 30–99)
ALT SERPL-CCNC: 24 U/L (ref 2–50)
ANION GAP SERPL CALC-SCNC: 9 MMOL/L (ref 0–11.9)
AST SERPL-CCNC: 19 U/L (ref 12–45)
BILIRUB SERPL-MCNC: 0.5 MG/DL (ref 0.1–1.5)
BUN SERPL-MCNC: 15 MG/DL (ref 8–22)
CALCIUM SERPL-MCNC: 9.7 MG/DL (ref 8.5–10.5)
CHLORIDE SERPL-SCNC: 101 MMOL/L (ref 96–112)
CO2 SERPL-SCNC: 28 MMOL/L (ref 20–33)
CREAT SERPL-MCNC: 0.69 MG/DL (ref 0.5–1.4)
ERYTHROCYTE [DISTWIDTH] IN BLOOD BY AUTOMATED COUNT: 40.2 FL (ref 35.9–50)
EST. AVERAGE GLUCOSE BLD GHB EST-MCNC: 120 MG/DL
GFR SERPL CREATININE-BSD FRML MDRD: >60 ML/MIN/1.73 M 2
GLOBULIN SER CALC-MCNC: 2.2 G/DL (ref 1.9–3.5)
GLUCOSE SERPL-MCNC: 107 MG/DL (ref 65–99)
HBA1C MFR BLD: 5.8 % (ref 0–5.6)
HCT VFR BLD AUTO: 46.6 % (ref 37–47)
HGB BLD-MCNC: 15.8 G/DL (ref 12–16)
MCH RBC QN AUTO: 30.3 PG (ref 27–33)
MCHC RBC AUTO-ENTMCNC: 33.9 G/DL (ref 33.6–35)
MCV RBC AUTO: 89.3 FL (ref 81.4–97.8)
PLATELET # BLD AUTO: 290 K/UL (ref 164–446)
PMV BLD AUTO: 8.6 FL (ref 9–12.9)
POTASSIUM SERPL-SCNC: 3.5 MMOL/L (ref 3.6–5.5)
PROT SERPL-MCNC: 6.6 G/DL (ref 6–8.2)
RBC # BLD AUTO: 5.22 M/UL (ref 4.2–5.4)
SODIUM SERPL-SCNC: 138 MMOL/L (ref 135–145)
T4 FREE SERPL-MCNC: 1.02 NG/DL (ref 0.53–1.43)
WBC # BLD AUTO: 7.6 K/UL (ref 4.8–10.8)

## 2017-12-18 PROCEDURE — 80053 COMPREHEN METABOLIC PANEL: CPT

## 2017-12-18 PROCEDURE — 82652 VIT D 1 25-DIHYDROXY: CPT

## 2017-12-18 PROCEDURE — 84439 ASSAY OF FREE THYROXINE: CPT

## 2017-12-18 PROCEDURE — 36415 COLL VENOUS BLD VENIPUNCTURE: CPT

## 2017-12-18 PROCEDURE — 85027 COMPLETE CBC AUTOMATED: CPT

## 2017-12-18 PROCEDURE — 83036 HEMOGLOBIN GLYCOSYLATED A1C: CPT

## 2017-12-20 LAB — 1,25(OH)2D3 SERPL-MCNC: 45.5 PG/ML (ref 19.9–79.3)

## 2017-12-28 ENCOUNTER — SLEEP CENTER VISIT (OUTPATIENT)
Dept: SLEEP MEDICINE | Facility: MEDICAL CENTER | Age: 52
End: 2017-12-28
Payer: COMMERCIAL

## 2017-12-28 VITALS
HEIGHT: 65 IN | OXYGEN SATURATION: 94 % | WEIGHT: 228 LBS | DIASTOLIC BLOOD PRESSURE: 78 MMHG | BODY MASS INDEX: 37.99 KG/M2 | HEART RATE: 79 BPM | SYSTOLIC BLOOD PRESSURE: 112 MMHG | RESPIRATION RATE: 15 BRPM

## 2017-12-28 DIAGNOSIS — G47.33 OSA (OBSTRUCTIVE SLEEP APNEA): ICD-10-CM

## 2017-12-28 PROBLEM — H66.001 RIGHT ACUTE SUPPURATIVE OTITIS MEDIA: Status: RESOLVED | Noted: 2017-01-06 | Resolved: 2017-12-28

## 2017-12-28 PROCEDURE — 99213 OFFICE O/P EST LOW 20 MIN: CPT | Performed by: NURSE PRACTITIONER

## 2017-12-28 NOTE — PROGRESS NOTES
Chief Complaint   Patient presents with   • Follow-Up     6 M         HPI: This patient is a 52 y.o. female, who presents for  follow-up of EMRE. Medical history includes HTN, HLD, prediabetes, former smoker 15 pack year history quitting 2 years ago.     In regards to EMRE, PSG indicates mild to moderate EMRE with an AHI of 12.6, REM index 22.6, minimum oxygen saturation of 76 %. she is compliant with auto CPAP 8-15 cm H2O. Compliance download over the past 30 days indicates 96.7 % compliance, average use of almost 6 hours per night, AHI of 1.6. Mask and pressures are comfortable. Patient reports benefiting from therapy. She denies daytime somnolence or a.m. headache. She reports getting a better quality of sleep, her snoring has resolved. No significant changes to her health in the past 6 months.    Past Medical History:   Diagnosis Date   • Acquired hypothyroidism 9/21/2016   • Anesthesia     nausea   • Arthritis     spine   • Basal cell carcinoma 9/21/2016   • Depression 9/21/2016   • Diabetes (CMS-HCC)    • Essential hypertension 9/21/2016   • Family history of melanoma 9/21/2016   • GERD (gastroesophageal reflux disease)    • Heart burn    • Hyperlipidemia 9/21/2016   • Hypertension    • Indigestion    • Nasal drainage    • Other specified disorder of intestines     diarrhea and constipation   • Prediabetes 9/21/2016   • Scarlet fever    • Snoring    • Substance abuse     sober for 5 years   • Thyroid disease    • Viral meningitis    • Vitamin D deficiency 9/21/2016       Social History   Substance Use Topics   • Smoking status: Former Smoker     Packs/day: 0.50     Years: 15.00     Types: Cigarettes     Quit date: 9/21/2015   • Smokeless tobacco: Never Used   • Alcohol use No      Comment: Sober since 2011       Family History   Problem Relation Age of Onset   • Dementia Mother    • Cancer Father      melanoma       Current medications as of today   Current Outpatient Prescriptions   Medication Sig Dispense  "Refill   • citalopram (CELEXA) 20 MG Tab TAKE ONE TABLET BY MOUTH ONCE DAILY 90 Tab 2   • progesterone (PROMETRIUM) 100 MG Cap TAKE ONE CAPSULE BY MOUTH ONCE DAILY 30 Cap 6   • losartan-hydrochlorothiazide (HYZAAR) 100-25 MG per tablet TAKE ONE TABLET BY MOUTH ONCE DAILY 30 Tab 6   • buPROPion (WELLBUTRIN XL) 300 MG XL tablet TAKE ONE TABLET BY MOUTH ONCE DAILY IN THE MORNING 30 Tab 6   • amlodipine (NORVASC) 10 MG Tab TAKE ONE TABLET BY MOUTH ONCE DAILY 30 Tab 6   • levothyroxine (SYNTHROID) 125 MCG Tab TAKE ONE TABLET BY MOUTH IN THE MORNING ON AN EMPTY STOMACH 30 Tab 6   • vitamin D, Ergocalciferol, (DRISDOL) 67647 units Cap capsule TAKE ONE CAPSULE BY MOUTH EVERY 7 DAYS 4 Cap 6   • Diclofenac Sodium 1 % Gel Apply thin film affected area q 8 hours prn pain 80 g 2   • fluticasone (FLONASE) 50 MCG/ACT nasal spray USE TWO SPRAY(S) IN EACH NOSTRIL ONCE DAILY 16 g 3   • estradiol (ESTRACE) 0.5 MG tablet Take 1 Tab by mouth every day. 30 Tab 3   • Loratadine (CLARITIN) 10 MG CAPS Take  by mouth every day.     • Multiple Vitamins-Minerals (MULTIVITAMIN PO) Take  by mouth every day.       No current facility-administered medications for this visit.        Allergies: Patient has no known allergies.    Blood pressure 112/78, pulse 79, resp. rate 15, height 1.651 m (5' 5\"), weight 103.4 kg (228 lb), SpO2 94 %.      ROS:   Constitutional: Denies fevers, chills, night sweats, weight loss or fatigue  Eyes: Denies pain, discharge/drainage  Respiratory: Denies cough, wheeze, dyspnea, hemoptysis  Cardiovascular: Denies chest pain, tightness, palpitations, orthopnea or edema  Sleep: See HPI  Neurological: Denies vertigo or headaches  Skin: Denies rashes, lesions  Psychiatric: Denies depression or anxiety    Physical exam:   Constitutional: Well-nourished, well-developed, in no acute distress  Eyes: PERRL, glasses  Neck: supple, no masses  Respiratory: no intercostal retractions or accessory muscle use   Lungs auscultation: Clear " to auscultation bilaterally  Cardiovascular: Regular rate rhythm no murmurs, rubs or gallops  Musculoskeletal: Normal gait, no clubbing or cyanosis  Skin: No rashes or lesions noted on exposed skin  Neuro: No focal deficit noted  Psychiatric: Oriented to time, person and place.     Diagnosis:  1. EMRE (obstructive sleep apnea)  DME MASK AND SUPPLIES   2. Adult BMI 36.0-36.9 kg/sq m         Plan:  1. Continue CPAP nightly  2. Order for new mask [facility] supplies [facility]  3. Clean mask & tubing weekly  4. Replace supplies as insurance will allow  5. Follow up annually, sooner if needed

## 2017-12-28 NOTE — PATIENT INSTRUCTIONS
1. Continue CPAP nightly  2. Order for new mask and supplies to [facility]  3. Clean mask & tubing weekly  4. Replace supplies as insurance will allow  5. Follow up annually, sooner if needed

## 2018-01-04 RX ORDER — ESTRADIOL 0.5 MG/1
TABLET ORAL
Qty: 90 TAB | Refills: 1 | Status: SHIPPED | OUTPATIENT
Start: 2018-01-04 | End: 2018-07-07 | Stop reason: SDUPTHER

## 2018-03-12 ENCOUNTER — OFFICE VISIT (OUTPATIENT)
Dept: URGENT CARE | Facility: CLINIC | Age: 53
End: 2018-03-12
Payer: COMMERCIAL

## 2018-03-12 ENCOUNTER — APPOINTMENT (OUTPATIENT)
Dept: RADIOLOGY | Facility: IMAGING CENTER | Age: 53
End: 2018-03-12
Attending: NURSE PRACTITIONER
Payer: COMMERCIAL

## 2018-03-12 VITALS
HEART RATE: 78 BPM | OXYGEN SATURATION: 92 % | RESPIRATION RATE: 20 BRPM | TEMPERATURE: 98 F | WEIGHT: 220 LBS | DIASTOLIC BLOOD PRESSURE: 78 MMHG | SYSTOLIC BLOOD PRESSURE: 120 MMHG | BODY MASS INDEX: 36.65 KG/M2 | HEIGHT: 65 IN

## 2018-03-12 DIAGNOSIS — R05.9 COUGH: ICD-10-CM

## 2018-03-12 DIAGNOSIS — R06.02 SOB (SHORTNESS OF BREATH): ICD-10-CM

## 2018-03-12 DIAGNOSIS — J40 BRONCHITIS: ICD-10-CM

## 2018-03-12 PROCEDURE — 99214 OFFICE O/P EST MOD 30 MIN: CPT | Performed by: NURSE PRACTITIONER

## 2018-03-12 PROCEDURE — 71046 X-RAY EXAM CHEST 2 VIEWS: CPT | Mod: TC,FY | Performed by: NURSE PRACTITIONER

## 2018-03-12 RX ORDER — AZITHROMYCIN 250 MG/1
TABLET, FILM COATED ORAL
Qty: 6 TAB | Refills: 0 | Status: CANCELLED | OUTPATIENT
Start: 2018-03-12

## 2018-03-12 RX ORDER — BENZONATATE 200 MG/1
200 CAPSULE ORAL 3 TIMES DAILY PRN
Qty: 60 CAP | Refills: 0 | Status: SHIPPED | OUTPATIENT
Start: 2018-03-12 | End: 2018-12-13

## 2018-03-12 RX ORDER — DOXYCYCLINE HYCLATE 100 MG
100 TABLET ORAL 2 TIMES DAILY
Qty: 14 TAB | Refills: 0 | Status: SHIPPED | OUTPATIENT
Start: 2018-03-12 | End: 2018-03-19

## 2018-03-12 RX ORDER — ALBUTEROL SULFATE 90 UG/1
2 AEROSOL, METERED RESPIRATORY (INHALATION) EVERY 6 HOURS PRN
Qty: 8.5 G | Refills: 0 | Status: SHIPPED | OUTPATIENT
Start: 2018-03-12 | End: 2021-01-19 | Stop reason: SDUPTHER

## 2018-03-12 ASSESSMENT — ENCOUNTER SYMPTOMS
MYALGIAS: 1
FEVER: 0
COUGH: 1
SPUTUM PRODUCTION: 1
SINUS PAIN: 1
CHILLS: 0
SORE THROAT: 1

## 2018-03-12 NOTE — PROGRESS NOTES
Subjective:      Alexandra Masterson is a 52 y.o. female who presents with Cough (Few days dry cough , chest congestion )     Past Medical History:   Diagnosis Date   • Acquired hypothyroidism 9/21/2016   • Anesthesia     nausea   • Arthritis     spine   • Basal cell carcinoma 9/21/2016   • Depression 9/21/2016   • Diabetes (CMS-HCC)    • Essential hypertension 9/21/2016   • Family history of melanoma 9/21/2016   • GERD (gastroesophageal reflux disease)    • Heart burn    • Hyperlipidemia 9/21/2016   • Hypertension    • Indigestion    • Nasal drainage    • Other specified disorder of intestines     diarrhea and constipation   • Prediabetes 9/21/2016   • Scarlet fever    • Snoring    • Substance abuse     sober for 5 years   • Thyroid disease    • Viral meningitis    • Vitamin D deficiency 9/21/2016     Social History     Social History   • Marital status:      Spouse name: N/A   • Number of children: N/A   • Years of education: N/A     Occupational History   • Not on file.     Social History Main Topics   • Smoking status: Former Smoker     Packs/day: 0.50     Years: 15.00     Types: Cigarettes     Quit date: 9/21/2015   • Smokeless tobacco: Never Used   • Alcohol use No      Comment: Sober since 2011   • Drug use: No   • Sexual activity: Yes     Partners: Male     Birth control/ protection: Surgical     Other Topics Concern   • Not on file     Social History Narrative   • No narrative on file     Family History   Problem Relation Age of Onset   • Dementia Mother    • Cancer Father      melanoma       Allergies: Patient has no known allergies.                Cough   This is a new problem. The current episode started in the past 7 days. The problem has been unchanged. The problem occurs every few minutes. The cough is non-productive. Associated symptoms include myalgias and a sore throat. Pertinent negatives include no chills or fever. Nothing aggravates the symptoms. She has tried nothing for the symptoms.  "The treatment provided no relief.       Review of Systems   Constitutional: Positive for malaise/fatigue. Negative for chills and fever.   HENT: Positive for congestion, sinus pain and sore throat.    Respiratory: Positive for cough and sputum production.    Musculoskeletal: Positive for myalgias.   Skin: Negative.    All other systems reviewed and are negative.         Objective:     /78   Pulse 78   Temp 36.7 °C (98 °F)   Resp 20   Ht 1.651 m (5' 5\")   Wt 99.8 kg (220 lb)   SpO2 92%   BMI 36.61 kg/m²      Physical Exam   Constitutional: She is oriented to person, place, and time. She appears well-developed and well-nourished.   HENT:   Head: Normocephalic.   Right Ear: External ear normal.   Left Ear: External ear normal.   Nose: Nose normal.   Mouth/Throat: Oropharynx is clear and moist.   Eyes: Conjunctivae are normal. Pupils are equal, round, and reactive to light.   Neck: Normal range of motion. Neck supple.   Cardiovascular: Normal rate and regular rhythm.    Pulmonary/Chest: Effort normal. She has wheezes.   Diminished to the RLL   Musculoskeletal: Normal range of motion.   Neurological: She is alert and oriented to person, place, and time.   Skin: Skin is warm and dry. Capillary refill takes less than 2 seconds.   Psychiatric: She has a normal mood and affect. Her behavior is normal. Judgment and thought content normal.   Vitals reviewed.    XR chest:           3/12/2018 4:38 PM    HISTORY/REASON FOR EXAM:  Shortness of Breath      TECHNIQUE/EXAM DESCRIPTION AND NUMBER OF VIEWS:  Two views of the chest.    COMPARISON:  None.    FINDINGS:    The cardiac silhouette  and mediastinal contours are normal.    No discrete opacity, pleural fluid or pneumothorax.    No suspicious bony lesions.       Impression       No acute findings.            Assessment/Plan:   Bronchitis  Cough   -declined albuterol TX in office   -doxycycline   -albuterol inhaler   -tessalon PRN cough   -ER precautions for " respiratory distress   There are no diagnoses linked to this encounter.

## 2018-04-24 RX ORDER — BUPROPION HYDROCHLORIDE 300 MG/1
TABLET ORAL
Qty: 90 TAB | Refills: 0 | Status: SHIPPED | OUTPATIENT
Start: 2018-04-24 | End: 2018-07-21 | Stop reason: SDUPTHER

## 2018-04-24 RX ORDER — LOSARTAN POTASSIUM AND HYDROCHLOROTHIAZIDE 25; 100 MG/1; MG/1
TABLET ORAL
Qty: 90 TAB | Refills: 0 | Status: SHIPPED | OUTPATIENT
Start: 2018-04-24 | End: 2018-07-21 | Stop reason: SDUPTHER

## 2018-04-24 RX ORDER — AMLODIPINE BESYLATE 10 MG/1
TABLET ORAL
Qty: 90 TAB | Refills: 0 | Status: SHIPPED | OUTPATIENT
Start: 2018-04-24 | End: 2018-07-21 | Stop reason: SDUPTHER

## 2018-04-24 RX ORDER — LEVOTHYROXINE SODIUM 0.12 MG/1
TABLET ORAL
Qty: 90 TAB | Refills: 0 | Status: SHIPPED | OUTPATIENT
Start: 2018-04-24 | End: 2018-07-21 | Stop reason: SDUPTHER

## 2018-05-15 RX ORDER — ERGOCALCIFEROL 1.25 MG/1
CAPSULE ORAL
Qty: 4 CAP | Refills: 6 | Status: SHIPPED | OUTPATIENT
Start: 2018-05-15 | End: 2019-03-13

## 2018-05-24 ENCOUNTER — OFFICE VISIT (OUTPATIENT)
Dept: MEDICAL GROUP | Facility: MEDICAL CENTER | Age: 53
End: 2018-05-24
Payer: COMMERCIAL

## 2018-05-24 ENCOUNTER — HOSPITAL ENCOUNTER (OUTPATIENT)
Facility: MEDICAL CENTER | Age: 53
End: 2018-05-24
Attending: FAMILY MEDICINE
Payer: COMMERCIAL

## 2018-05-24 VITALS
WEIGHT: 220 LBS | DIASTOLIC BLOOD PRESSURE: 74 MMHG | OXYGEN SATURATION: 95 % | SYSTOLIC BLOOD PRESSURE: 134 MMHG | RESPIRATION RATE: 20 BRPM | BODY MASS INDEX: 36.65 KG/M2 | HEART RATE: 72 BPM | TEMPERATURE: 97.7 F | HEIGHT: 65 IN

## 2018-05-24 DIAGNOSIS — N76.1 SUBACUTE VAGINITIS: ICD-10-CM

## 2018-05-24 DIAGNOSIS — F33.41 RECURRENT MAJOR DEPRESSIVE DISORDER, IN PARTIAL REMISSION (HCC): ICD-10-CM

## 2018-05-24 PROCEDURE — 87070 CULTURE OTHR SPECIMN AEROBIC: CPT

## 2018-05-24 PROCEDURE — 87510 GARDNER VAG DNA DIR PROBE: CPT

## 2018-05-24 PROCEDURE — 99214 OFFICE O/P EST MOD 30 MIN: CPT | Performed by: FAMILY MEDICINE

## 2018-05-24 PROCEDURE — 87480 CANDIDA DNA DIR PROBE: CPT

## 2018-05-24 PROCEDURE — 87205 SMEAR GRAM STAIN: CPT

## 2018-05-24 PROCEDURE — 87660 TRICHOMONAS VAGIN DIR PROBE: CPT

## 2018-05-24 RX ORDER — CITALOPRAM 40 MG/1
40 TABLET ORAL DAILY
Qty: 90 TAB | Refills: 3 | Status: SHIPPED | OUTPATIENT
Start: 2018-05-24 | End: 2019-05-10 | Stop reason: SDUPTHER

## 2018-05-24 ASSESSMENT — PATIENT HEALTH QUESTIONNAIRE - PHQ9
5. POOR APPETITE OR OVEREATING: 2 - MORE THAN HALF THE DAYS
SUM OF ALL RESPONSES TO PHQ QUESTIONS 1-9: 7
CLINICAL INTERPRETATION OF PHQ2 SCORE: 2

## 2018-05-24 NOTE — ASSESSMENT & PLAN NOTE
For 6 months every couple weeks she gets vaginal itching without discharge.  Using Monistat which helps but then then the symptoms recur.  Patient is a long term monogamous relationship

## 2018-05-25 LAB
CANDIDA DNA VAG QL PROBE+SIG AMP: NEGATIVE
G VAGINALIS DNA VAG QL PROBE+SIG AMP: NEGATIVE
GRAM STN SPEC: NORMAL
SIGNIFICANT IND 70042: NORMAL
SITE SITE: NORMAL
SOURCE SOURCE: NORMAL
T VAGINALIS DNA VAG QL PROBE+SIG AMP: NEGATIVE

## 2018-05-27 NOTE — PROGRESS NOTES
Subjective:     Chief Complaint   Patient presents with   • Vaginal Itching     x 6 months; intermittenly, no discharge, mild odor, no pelvic pain or bloating, no issues urinating or with bowel movements       Alexandra Masterson is a 52 y.o. female here today for evaluation and management of:    Subacute vaginitis  For 6 months every couple weeks she gets vaginal itching without discharge.  Using Monistat which helps but then then the symptoms recur.  Patient is a long term monogamous relationship    Recurrent major depressive disorder, in partial remission (HCC)  Has been more depressed for the last several months. Currently taking Citalopram 20 mg and Wellbutrin 300 mg as prescribed.   Denies side effects and is tolerating well.  Mood is improved with current medication and therapy but not doing as well as she had been doing the past    Patient denies SI/HI.  Depression Screen (PHQ-2/PHQ-9) 12/15/2016 5/24/2018   PHQ-2 Total Score 1 2   PHQ-9 Total Score - 7            No Known Allergies    Current medicines (including changes today)  Current Outpatient Prescriptions   Medication Sig Dispense Refill   • citalopram (CELEXA) 40 MG Tab Take 1 Tab by mouth every day. 90 Tab 3   • vitamin D, Ergocalciferol, (DRISDOL) 62015 units Cap capsule TAKE ONE CAPSULE BY MOUTH ONCE A WEEK 4 Cap 6   • buPROPion (WELLBUTRIN XL) 300 MG XL tablet TAKE ONE TABLET BY MOUTH ONCE DAILY IN THE MORNING 90 Tab 0   • losartan-hydrochlorothiazide (HYZAAR) 100-25 MG per tablet TAKE ONE TABLET BY MOUTH ONCE DAILY 90 Tab 0   • amLODIPine (NORVASC) 10 MG Tab TAKE ONE TABLET BY MOUTH ONCE DAILY 90 Tab 0   • levothyroxine (SYNTHROID) 125 MCG Tab TAKE ONE TABLET BY MOUTH ONCE DAILY IN THE MORNING ON EMPTY STOMACH 90 Tab 0   • estradiol (ESTRACE) 0.5 MG tablet TAKE ONE TABLET BY MOUTH ONCE DAILY 90 Tab 1   • progesterone (PROMETRIUM) 100 MG Cap TAKE ONE CAPSULE BY MOUTH ONCE DAILY 30 Cap 6   • Diclofenac Sodium 1 % Gel Apply thin film affected  area q 8 hours prn pain 80 g 2   • fluticasone (FLONASE) 50 MCG/ACT nasal spray USE TWO SPRAY(S) IN EACH NOSTRIL ONCE DAILY 16 g 3   • Loratadine (CLARITIN) 10 MG CAPS Take  by mouth every day.     • Multiple Vitamins-Minerals (MULTIVITAMIN PO) Take  by mouth every day.     • albuterol 108 (90 Base) MCG/ACT Aero Soln inhalation aerosol Inhale 2 Puffs by mouth every 6 hours as needed. 8.5 g 0   • benzonatate (TESSALON) 200 MG capsule Take 1 Cap by mouth 3 times a day as needed. (Patient not taking: Reported on 5/24/2018) 60 Cap 0     No current facility-administered medications for this visit.        She  has a past medical history of Acquired hypothyroidism (9/21/2016); Anesthesia; Arthritis; Basal cell carcinoma (9/21/2016); Depression (9/21/2016); Diabetes (HCC); Essential hypertension (9/21/2016); Family history of melanoma (9/21/2016); GERD (gastroesophageal reflux disease); Heart burn; Hyperlipidemia (9/21/2016); Hypertension; Indigestion; Nasal drainage; Other specified disorder of intestines; Prediabetes (9/21/2016); Scarlet fever; Snoring; Substance abuse; Thyroid disease; Viral meningitis; and Vitamin D deficiency (9/21/2016).    Patient Active Problem List    Diagnosis Date Noted   • Subacute vaginitis 05/24/2018   • Acute pain of right knee 08/23/2017   • Left elbow pain 08/23/2017   • EMRE (obstructive sleep apnea) 06/27/2017   • Essential hypertension 09/21/2016   • Acquired hypothyroidism 09/21/2016   • Recurrent major depressive disorder, in partial remission (HCC) 09/21/2016   • Vitamin D deficiency 09/21/2016   • Hyperlipidemia 09/21/2016   • Prediabetes 09/21/2016   • Weight gain 09/21/2016   • Daytime somnolence 09/21/2016   • Adult BMI 36.0-36.9 kg/sq m 09/21/2016   • Basal cell carcinoma 09/21/2016   • Family history of melanoma 09/21/2016   • 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  No nausea or vomiting.  No chest pain or palpitations.   "No cough or SOB.  No pain with urination or hematuria.  No black or bloody stools.       Objective:     Blood pressure 134/74, pulse 72, temperature 36.5 °C (97.7 °F), resp. rate 20, height 1.651 m (5' 5\"), weight 99.8 kg (220 lb), SpO2 95 %. Body mass index is 36.61 kg/m².   Physical Exam:  Well developed, well nourished.  Alert, oriented in no acute distress.  Eye contact is good, speech goal directed, affect calm  Eyes: conjunctiva non-injected, sclera non-icteric.  Neck Supple.  No adenopathy or masses in the neck or supraclavicular regions. No thyromegaly  Lungs: clear to auscultation bilaterally with good excursion. No wheezes or rhonchi  CV: regular rate and rhythm. No murmur  Abdomen: soft, nontender, no masses or organomegaly.  No rebound or guarding  PELVIC:Perineum and external genitalia normal without rash. Vagina with yellow and thin discharge. Cervix without visible lesions or discharge. Bimanual exam without adnexal masses or cervical motion tenderness.              Assessment and Plan:   The following treatment plan was discussed    1. Subacute vaginitis  Await culture results  - VAGINAL PATHOGENS DNA PANEL; Future  - GENITAL CULTURE, ROUTINE    2. Recurrent major depressive disorder, in partial remission (HCC)  Increase Celexa to 40 mg daily and continue Wellbutrin 300 mg daily  - citalopram (CELEXA) 40 MG Tab; Take 1 Tab by mouth every day.  Dispense: 90 Tab; Refill: 3    Any change or worsening of signs or symptoms, patient encouraged to follow-up or report to the emergency room for further evaluation. Patient understands and agrees.    Followup: Return if symptoms worsen or fail to improve.           "

## 2018-05-27 NOTE — ASSESSMENT & PLAN NOTE
Has been more depressed for the last several months. Currently taking Citalopram 20 mg and Wellbutrin 300 mg as prescribed.   Denies side effects and is tolerating well.  Mood is improved with current medication and therapy but not doing as well as she had been doing the past    Patient denies SI/HI.  Depression Screen (PHQ-2/PHQ-9) 12/15/2016 5/24/2018   PHQ-2 Total Score 1 2   PHQ-9 Total Score - 7

## 2018-05-28 LAB
BACTERIA GENITAL AEROBE CULT: NORMAL
GRAM STN SPEC: NORMAL
SIGNIFICANT IND 70042: NORMAL
SITE SITE: NORMAL
SOURCE SOURCE: NORMAL

## 2018-05-31 ENCOUNTER — PATIENT MESSAGE (OUTPATIENT)
Dept: MEDICAL GROUP | Facility: MEDICAL CENTER | Age: 53
End: 2018-05-31

## 2018-05-31 DIAGNOSIS — Z12.83 SCREENING FOR SKIN CANCER: ICD-10-CM

## 2018-05-31 NOTE — TELEPHONE ENCOUNTER
From: Alexnadra Masterson  To: Esha Browne M.D.  Sent: 5/31/2018 10:13 AM PDT  Subject: Prescription Question    Thank you for getting back to me so quickly. I think I would like to have the Diflucan for future use. Thank you.    One more thing, could I please get a referral for Dermatology. I've been a long time patient of the [facility], but [facility] Guess it is time for a [facility] MD.     Again, thanks,    Alexandra Masterson

## 2018-06-14 ENCOUNTER — PATIENT MESSAGE (OUTPATIENT)
Dept: MEDICAL GROUP | Facility: MEDICAL CENTER | Age: 53
End: 2018-06-14

## 2018-06-14 DIAGNOSIS — R30.0 DYSURIA: ICD-10-CM

## 2018-06-15 ENCOUNTER — HOSPITAL ENCOUNTER (OUTPATIENT)
Dept: LAB | Facility: MEDICAL CENTER | Age: 53
End: 2018-06-15
Attending: FAMILY MEDICINE
Payer: COMMERCIAL

## 2018-06-15 DIAGNOSIS — R30.0 DYSURIA: ICD-10-CM

## 2018-06-15 LAB
APPEARANCE UR: CLEAR
BACTERIA #/AREA URNS HPF: ABNORMAL /HPF
BILIRUB UR QL STRIP.AUTO: NEGATIVE
COLOR UR: YELLOW
EPI CELLS #/AREA URNS HPF: ABNORMAL /HPF
GLUCOSE UR STRIP.AUTO-MCNC: NEGATIVE MG/DL
HYALINE CASTS #/AREA URNS LPF: ABNORMAL /LPF
KETONES UR STRIP.AUTO-MCNC: NEGATIVE MG/DL
LEUKOCYTE ESTERASE UR QL STRIP.AUTO: ABNORMAL
MICRO URNS: ABNORMAL
NITRITE UR QL STRIP.AUTO: NEGATIVE
PH UR STRIP.AUTO: 6.5 [PH]
PROT UR QL STRIP: NEGATIVE MG/DL
RBC # URNS HPF: ABNORMAL /HPF
RBC UR QL AUTO: NEGATIVE
SP GR UR STRIP.AUTO: 1.01
UROBILINOGEN UR STRIP.AUTO-MCNC: 0.2 MG/DL
WBC #/AREA URNS HPF: ABNORMAL /HPF

## 2018-06-15 PROCEDURE — 81001 URINALYSIS AUTO W/SCOPE: CPT

## 2018-06-15 PROCEDURE — 87186 SC STD MICRODIL/AGAR DIL: CPT

## 2018-06-15 PROCEDURE — 87086 URINE CULTURE/COLONY COUNT: CPT

## 2018-06-15 PROCEDURE — 87077 CULTURE AEROBIC IDENTIFY: CPT

## 2018-06-17 LAB
BACTERIA UR CULT: ABNORMAL
BACTERIA UR CULT: ABNORMAL
SIGNIFICANT IND 70042: ABNORMAL
SITE SITE: ABNORMAL
SOURCE SOURCE: ABNORMAL

## 2018-06-18 RX ORDER — NITROFURANTOIN 25; 75 MG/1; MG/1
100 CAPSULE ORAL 2 TIMES DAILY
Qty: 14 CAP | Refills: 0 | Status: SHIPPED | OUTPATIENT
Start: 2018-06-18 | End: 2018-12-13

## 2018-06-19 ENCOUNTER — PATIENT MESSAGE (OUTPATIENT)
Dept: MEDICAL GROUP | Facility: MEDICAL CENTER | Age: 53
End: 2018-06-19

## 2018-07-02 ENCOUNTER — HOSPITAL ENCOUNTER (OUTPATIENT)
Dept: LAB | Facility: MEDICAL CENTER | Age: 53
End: 2018-07-02
Payer: COMMERCIAL

## 2018-07-02 LAB
BDY FAT % MEASURED: 43.6 %
BP DIAS: 65 MMHG
BP SYS: 115 MMHG
CHOLEST SERPL-MCNC: 205 MG/DL (ref 100–199)
DIABETES HTDIA: NO
EVENT NAME HTEVT: NORMAL
FASTING HTFAS: YES
GLUCOSE SERPL-MCNC: 93 MG/DL (ref 65–99)
HDLC SERPL-MCNC: 54 MG/DL
HYPERTENSION HTHYP: NO
LDLC SERPL CALC-MCNC: 125 MG/DL
SCREENING LOC CITY HTCIT: NORMAL
SCREENING LOC STATE HTSTA: NORMAL
SCREENING LOCATION HTLOC: NORMAL
SMOKING HTSMO: NO
SUBSCRIBER ID HTSID: NORMAL
TRIGL SERPL-MCNC: 131 MG/DL (ref 0–149)

## 2018-07-02 PROCEDURE — 36415 COLL VENOUS BLD VENIPUNCTURE: CPT

## 2018-07-02 PROCEDURE — 82947 ASSAY GLUCOSE BLOOD QUANT: CPT

## 2018-07-02 PROCEDURE — S5190 WELLNESS ASSESSMENT BY NONPH: HCPCS

## 2018-07-02 PROCEDURE — 80061 LIPID PANEL: CPT

## 2018-07-09 RX ORDER — ESTRADIOL 0.5 MG/1
TABLET ORAL
Qty: 90 TAB | Refills: 2 | Status: SHIPPED | OUTPATIENT
Start: 2018-07-09 | End: 2019-01-22 | Stop reason: SDUPTHER

## 2018-07-26 RX ORDER — LEVOTHYROXINE SODIUM 0.12 MG/1
TABLET ORAL
Qty: 90 TAB | Refills: 2 | Status: SHIPPED | OUTPATIENT
Start: 2018-07-26 | End: 2019-03-18 | Stop reason: SDUPTHER

## 2018-07-26 RX ORDER — AMLODIPINE BESYLATE 10 MG/1
TABLET ORAL
Qty: 90 TAB | Refills: 2 | Status: SHIPPED | OUTPATIENT
Start: 2018-07-26 | End: 2019-05-10 | Stop reason: SDUPTHER

## 2018-07-26 RX ORDER — BUPROPION HYDROCHLORIDE 300 MG/1
TABLET ORAL
Qty: 90 TAB | Refills: 2 | Status: SHIPPED | OUTPATIENT
Start: 2018-07-26 | End: 2019-05-10 | Stop reason: SDUPTHER

## 2018-07-26 RX ORDER — LOSARTAN POTASSIUM AND HYDROCHLOROTHIAZIDE 25; 100 MG/1; MG/1
TABLET ORAL
Qty: 90 TAB | Refills: 2 | Status: SHIPPED | OUTPATIENT
Start: 2018-07-26 | End: 2019-05-10 | Stop reason: SDUPTHER

## 2018-08-15 ENCOUNTER — OFFICE VISIT (OUTPATIENT)
Dept: URGENT CARE | Facility: CLINIC | Age: 53
End: 2018-08-15
Payer: COMMERCIAL

## 2018-08-15 VITALS
BODY MASS INDEX: 37.15 KG/M2 | RESPIRATION RATE: 18 BRPM | WEIGHT: 223 LBS | TEMPERATURE: 97.1 F | HEART RATE: 68 BPM | OXYGEN SATURATION: 96 % | DIASTOLIC BLOOD PRESSURE: 65 MMHG | SYSTOLIC BLOOD PRESSURE: 110 MMHG | HEIGHT: 65 IN

## 2018-08-15 DIAGNOSIS — H61.23 BILATERAL IMPACTED CERUMEN: ICD-10-CM

## 2018-08-15 PROCEDURE — 99214 OFFICE O/P EST MOD 30 MIN: CPT | Performed by: PHYSICIAN ASSISTANT

## 2018-08-15 ASSESSMENT — ENCOUNTER SYMPTOMS
SORE THROAT: 0
CHILLS: 0
FEVER: 0
PALPITATIONS: 0
HEADACHES: 1
EYE PAIN: 0
COUGH: 0
SHORTNESS OF BREATH: 0

## 2018-08-15 NOTE — PROGRESS NOTES
Subjective:      Alexandra Mastersno is a 52 y.o. female who presents with Headache (Couple days right earache and headache)            Ear Fullness   This is a new problem. The current episode started 1 to 4 weeks ago. The problem occurs constantly. The problem has been waxing and waning. Associated symptoms include headaches. Pertinent negatives include no chest pain, chills, congestion, coughing, fever or sore throat. Nothing aggravates the symptoms. Treatments tried: sudafed.       Review of Systems   Constitutional: Negative for chills and fever.   HENT: Positive for ear pain and hearing loss. Negative for congestion, ear discharge, sore throat and tinnitus.    Eyes: Negative for pain.   Respiratory: Negative for cough and shortness of breath.    Cardiovascular: Negative for chest pain and palpitations.   Neurological: Positive for headaches.   All other systems reviewed and are negative.      PMH:  has a past medical history of Acquired hypothyroidism (9/21/2016); Anesthesia; Arthritis; Basal cell carcinoma (9/21/2016); Depression (9/21/2016); Diabetes (HCC); Essential hypertension (9/21/2016); Family history of melanoma (9/21/2016); GERD (gastroesophageal reflux disease); Heart burn; Hyperlipidemia (9/21/2016); Hypertension; Indigestion; Nasal drainage; Other specified disorder of intestines; Prediabetes (9/21/2016); Scarlet fever; Snoring; Substance abuse; Thyroid disease; Viral meningitis; and Vitamin D deficiency (9/21/2016).  MEDS:   Current Outpatient Prescriptions:   •  levothyroxine (SYNTHROID) 125 MCG Tab, TAKE 1 TABLET BY MOUTH ONCE DAILY IN THE MORNING ON AN EMPTY STOMACH, Disp: 90 Tab, Rfl: 2  •  buPROPion (WELLBUTRIN XL) 300 MG XL tablet, TAKE 1 TABLET BY MOUTH ONCE DAILY IN THE MORNING, Disp: 90 Tab, Rfl: 2  •  losartan-hydrochlorothiazide (HYZAAR) 100-25 MG per tablet, TAKE 1 TABLET BY MOUTH ONCE DAILY, Disp: 90 Tab, Rfl: 2  •  amLODIPine (NORVASC) 10 MG Tab, TAKE 1 TABLET BY MOUTH ONCE DAILY,  Disp: 90 Tab, Rfl: 2  •  estradiol (ESTRACE) 0.5 MG tablet, TAKE ONE TABLET BY MOUTH ONCE DAILY, Disp: 90 Tab, Rfl: 2  •  citalopram (CELEXA) 40 MG Tab, Take 1 Tab by mouth every day., Disp: 90 Tab, Rfl: 3  •  vitamin D, Ergocalciferol, (DRISDOL) 49137 units Cap capsule, TAKE ONE CAPSULE BY MOUTH ONCE A WEEK, Disp: 4 Cap, Rfl: 6  •  progesterone (PROMETRIUM) 100 MG Cap, TAKE ONE CAPSULE BY MOUTH ONCE DAILY, Disp: 30 Cap, Rfl: 6  •  Loratadine (CLARITIN) 10 MG CAPS, Take  by mouth every day., Disp: , Rfl:   •  Multiple Vitamins-Minerals (MULTIVITAMIN PO), Take  by mouth every day., Disp: , Rfl:   •  nitrofurantoin monohydr macro (MACROBID) 100 MG Cap, Take 1 Cap by mouth 2 times a day. (Patient not taking: Reported on 8/15/2018), Disp: 14 Cap, Rfl: 0  •  albuterol 108 (90 Base) MCG/ACT Aero Soln inhalation aerosol, Inhale 2 Puffs by mouth every 6 hours as needed., Disp: 8.5 g, Rfl: 0  •  benzonatate (TESSALON) 200 MG capsule, Take 1 Cap by mouth 3 times a day as needed. (Patient not taking: Reported on 8/15/2018), Disp: 60 Cap, Rfl: 0  •  Diclofenac Sodium 1 % Gel, Apply thin film affected area q 8 hours prn pain (Patient not taking: Reported on 8/15/2018), Disp: 80 g, Rfl: 2  •  fluticasone (FLONASE) 50 MCG/ACT nasal spray, USE TWO SPRAY(S) IN EACH NOSTRIL ONCE DAILY, Disp: 16 g, Rfl: 3  ALLERGIES: No Known Allergies  SURGHX:   Past Surgical History:   Procedure Laterality Date   • THYROIDECTOMY TOTAL  8/13/2013    Performed by Adonay Vega M.D. at SURGERY SAME DAY [facility]   • GYN SURGERY  2008    d and c   • GERMÁN BY LAPAROSCOPY  1994     SOCHX:  reports that she quit smoking about 2 years ago. Her smoking use included Cigarettes. She has a 7.50 pack-year smoking history. She has never used smokeless tobacco. She reports that she does not drink alcohol or use drugs.  FH: Family history was reviewed, no pertinent findings to report  Medications, Allergies, and current problem list reviewed today in Epic      "Objective:     /65   Pulse 68   Temp 36.2 °C (97.1 °F)   Resp 18   Ht 1.651 m (5' 5\")   Wt 101.2 kg (223 lb)   SpO2 96%   BMI 37.11 kg/m²      Physical Exam   Constitutional: She is oriented to person, place, and time. She appears well-developed and well-nourished. She is active.  Non-toxic appearance. She does not have a sickly appearance. She does not appear ill. No distress.   HENT:   Head: Normocephalic and atraumatic.   Right Ear: Hearing, tympanic membrane, external ear and ear canal normal.   Left Ear: Hearing, tympanic membrane, external ear and ear canal normal.   Nose: Nose normal.   Mouth/Throat: Uvula is midline, oropharynx is clear and moist and mucous membranes are normal.   Bilateral cerumen impaction     Eyes: Conjunctivae, EOM and lids are normal. Right eye exhibits no discharge. Left eye exhibits no discharge.   Neck: Normal range of motion and full passive range of motion without pain. Neck supple.   Cardiovascular: Normal rate, regular rhythm and normal heart sounds.  Exam reveals no gallop and no friction rub.    No murmur heard.  Pulmonary/Chest: Effort normal and breath sounds normal. No respiratory distress. She has no decreased breath sounds. She has no wheezes. She has no rhonchi. She has no rales. She exhibits no tenderness.   Musculoskeletal: Normal range of motion.   Neurological: She is alert and oriented to person, place, and time.   Skin: Skin is warm, dry and intact.   Psychiatric: She has a normal mood and affect. Her speech is normal and behavior is normal. Judgment and thought content normal.   Vitals reviewed.              Assessment/Plan:   Procedure: Cerumen Removal  Risks and benefits of procedure discussed  Cerumen removed with curette and lavage after softening agent instilled  Patient tolerated well  Post procedure exam with clear canal and normal TM    1. Bilateral impacted cerumen      Differential diagnosis, natural history, supportive care discussed. " Follow-up with primary care provider within 7-10 days, emergency room precautions discussed.  Patient and/or family appears understanding of information.  Handout and review of patients diagnosis and treatment was discussed extensively.

## 2018-08-27 ENCOUNTER — PATIENT MESSAGE (OUTPATIENT)
Dept: SLEEP MEDICINE | Facility: MEDICAL CENTER | Age: 53
End: 2018-08-27

## 2018-08-27 NOTE — TELEPHONE ENCOUNTER
From: Alexandra Masterson  To: ANASTASIIA Aguila  Sent: 8/27/2018 4:41 PM PDT  Subject: Prescription Question    I would like to get back on Therapy. Would you please send a new RX to [facility] for CPAP.   Thank you so much,  Alexandra Masterson  [phone]

## 2018-09-19 ENCOUNTER — IMMUNIZATION (OUTPATIENT)
Dept: OCCUPATIONAL MEDICINE | Facility: CLINIC | Age: 53
End: 2018-09-19

## 2018-09-19 DIAGNOSIS — Z23 NEED FOR VACCINATION: ICD-10-CM

## 2018-09-19 PROCEDURE — 90686 IIV4 VACC NO PRSV 0.5 ML IM: CPT | Performed by: PREVENTIVE MEDICINE

## 2018-10-16 ENCOUNTER — OFFICE VISIT (OUTPATIENT)
Dept: DERMATOLOGY | Facility: IMAGING CENTER | Age: 53
End: 2018-10-16
Payer: COMMERCIAL

## 2018-10-16 ENCOUNTER — HOSPITAL ENCOUNTER (OUTPATIENT)
Facility: MEDICAL CENTER | Age: 53
End: 2018-10-16
Attending: DERMATOLOGY
Payer: COMMERCIAL

## 2018-10-16 VITALS — TEMPERATURE: 97.4 F | HEIGHT: 66 IN | WEIGHT: 225 LBS | BODY MASS INDEX: 36.16 KG/M2

## 2018-10-16 DIAGNOSIS — L72.0 EIC (EPIDERMAL INCLUSION CYST): ICD-10-CM

## 2018-10-16 DIAGNOSIS — Z85.828 HISTORY OF BASAL CELL CARCINOMA: ICD-10-CM

## 2018-10-16 DIAGNOSIS — D48.5 NEOPLASM OF UNCERTAIN BEHAVIOR OF SKIN: ICD-10-CM

## 2018-10-16 DIAGNOSIS — L57.0 ACTINIC KERATOSES: ICD-10-CM

## 2018-10-16 DIAGNOSIS — D22.9 MULTIPLE NEVI: ICD-10-CM

## 2018-10-16 PROCEDURE — 17000 DESTRUCT PREMALG LESION: CPT | Performed by: DERMATOLOGY

## 2018-10-16 PROCEDURE — 99203 OFFICE O/P NEW LOW 30 MIN: CPT | Mod: 25 | Performed by: DERMATOLOGY

## 2018-10-16 PROCEDURE — 88305 TISSUE EXAM BY PATHOLOGIST: CPT

## 2018-10-16 PROCEDURE — 11100 PR BIOPSY OF SKIN LESION: CPT | Mod: 59 | Performed by: DERMATOLOGY

## 2018-10-16 PROCEDURE — 17003 DESTRUCT PREMALG LES 2-14: CPT | Performed by: DERMATOLOGY

## 2018-10-16 ASSESSMENT — ENCOUNTER SYMPTOMS: FEVER: 0

## 2018-10-16 NOTE — PROGRESS NOTES
Dermatology New Patient Visit    Chief Complaint   Patient presents with   • Establish Care       Subjective:     HPI:   Alexandra Masterson is a 53 y.o. female presenting for    Establish care, full skin exam.   Was a pt at [facility], records in chart  Last exam 1 year ago.    HPI/location: red lesion on left side of nose  Time present: several months  Painful lesion: No  Itching lesion: No  Enlarging lesion: No  Anything make it better or worse? Wearing glasses    HPI/location: red scaly lesion left upper arm  Time present: 2 months  Painful lesion: No  Itching lesion: No  Enlarging lesion: No  Anything make it better or worse?     HPI/location: lump right upper arm  Time present: as a child  Painful lesion: No  Itching lesion: No  Enlarging lesion: Yes  Anything make it better or worse? n/a    HPI/location: spot on the right lower leg  Time present: years (since teen)  Painful lesion: No  Itching lesion: No  Enlarging lesion: No  Anything make it better or worse? n/a    History of skin cancer: Yes, Details: BCC left shoulder  History of precancers/actinic keratoses: Yes, Details: cryotherapy on tip ofn ose  History of biopsies:Yes, Details: see above  History of blistering/severe sunburns:Yes, Details: as a child  Family history of skin cancer:Yes, Details: Melanoma-  Family history of atypical moles:No        Past Medical History:   Diagnosis Date   • Acquired hypothyroidism 2016   • Anesthesia     nausea   • Arthritis     spine   • Basal cell carcinoma 2016   • Depression 2016   • Diabetes (HCC)    • Essential hypertension 2016   • Family history of melanoma 2016   • GERD (gastroesophageal reflux disease)    • Heart burn    • Hyperlipidemia 2016   • Hypertension    • Indigestion    • Nasal drainage    • Other specified disorder of intestines     diarrhea and constipation   • Prediabetes 2016   • Scarlet fever    • Snoring    • Substance abuse (HCC)     sober  for 5 years   • Thyroid disease    • Viral meningitis    • Vitamin D deficiency 9/21/2016       Current Outpatient Prescriptions on File Prior to Visit   Medication Sig Dispense Refill   • levothyroxine (SYNTHROID) 125 MCG Tab TAKE 1 TABLET BY MOUTH ONCE DAILY IN THE MORNING ON AN EMPTY STOMACH 90 Tab 2   • buPROPion (WELLBUTRIN XL) 300 MG XL tablet TAKE 1 TABLET BY MOUTH ONCE DAILY IN THE MORNING 90 Tab 2   • losartan-hydrochlorothiazide (HYZAAR) 100-25 MG per tablet TAKE 1 TABLET BY MOUTH ONCE DAILY 90 Tab 2   • amLODIPine (NORVASC) 10 MG Tab TAKE 1 TABLET BY MOUTH ONCE DAILY 90 Tab 2   • estradiol (ESTRACE) 0.5 MG tablet TAKE ONE TABLET BY MOUTH ONCE DAILY 90 Tab 2   • nitrofurantoin monohydr macro (MACROBID) 100 MG Cap Take 1 Cap by mouth 2 times a day. (Patient not taking: Reported on 8/15/2018) 14 Cap 0   • citalopram (CELEXA) 40 MG Tab Take 1 Tab by mouth every day. 90 Tab 3   • vitamin D, Ergocalciferol, (DRISDOL) 03582 units Cap capsule TAKE ONE CAPSULE BY MOUTH ONCE A WEEK 4 Cap 6   • albuterol 108 (90 Base) MCG/ACT Aero Soln inhalation aerosol Inhale 2 Puffs by mouth every 6 hours as needed. 8.5 g 0   • benzonatate (TESSALON) 200 MG capsule Take 1 Cap by mouth 3 times a day as needed. (Patient not taking: Reported on 8/15/2018) 60 Cap 0   • progesterone (PROMETRIUM) 100 MG Cap TAKE ONE CAPSULE BY MOUTH ONCE DAILY 30 Cap 6   • Diclofenac Sodium 1 % Gel Apply thin film affected area q 8 hours prn pain (Patient not taking: Reported on 8/15/2018) 80 g 2   • fluticasone (FLONASE) 50 MCG/ACT nasal spray USE TWO SPRAY(S) IN EACH NOSTRIL ONCE DAILY 16 g 3   • Loratadine (CLARITIN) 10 MG CAPS Take  by mouth every day.     • Multiple Vitamins-Minerals (MULTIVITAMIN PO) Take  by mouth every day.       No current facility-administered medications on file prior to visit.        No Known Allergies    Family History   Problem Relation Age of Onset   • Dementia Mother    • Cancer Father         melanoma       Social  "History     Social History   • Marital status:      Spouse name: N/A   • Number of children: N/A   • Years of education: N/A     Occupational History   • Not on file.     Social History Main Topics   • Smoking status: Former Smoker     Packs/day: 0.50     Years: 15.00     Types: Cigarettes     Quit date: 9/21/2015   • Smokeless tobacco: Never Used   • Alcohol use No      Comment: Sober since 2011   • Drug use: No   • Sexual activity: Yes     Partners: Male     Birth control/ protection: Surgical     Other Topics Concern   • Not on file     Social History Narrative   • No narrative on file       Review of Systems   Constitutional: Negative for fever.   Skin: Negative for itching and rash.   All other systems reviewed and are negative.       Objective:     A full mucocutaneous exam was completed including: scalp, hair, ears, face, eyelids, conjunctiva, lips, gums/tongue/oropharynx, neck, chest breasts, abdomen, back, left and right upper extremities (including hands/digits and fingernails), left and right lower extremities (including feet/toes, toenails), buttocks, excluding external genitalia (patient refusal) with the following pertinent findings listed below. Remaining above-listed examined areas within normal limits / negative for rashes or lesions.    Temperature 36.3 °C (97.4 °F), height 1.676 m (5' 6\"), weight 102.1 kg (225 lb), not currently breastfeeding.    Physical Exam   Constitutional: She is oriented to person, place, and time and well-developed, well-nourished, and in no distress.   HENT:   Head: Normocephalic and atraumatic.       Right Ear: External ear normal.   Left Ear: External ear normal.   Nose: Nose normal.   Mouth/Throat: Oropharynx is clear and moist.   Eyes: Conjunctivae and lids are normal.   Neck: Normal range of motion. Neck supple.   Cardiovascular: Intact distal pulses.    Pulmonary/Chest: Effort normal.   Neurological: She is alert and oriented to person, place, and time.   "   Skin: Skin is warm and dry.        Psychiatric: Mood and affect normal.   Vitals reviewed.      DATA: none applicable to review    Assessment and Plan:     1. Neoplasm of uncertain behavior of skin - left shoulder  Procedure Note   Procedure: Biopsy by shave technique  Location: as noted above  Size: as noted in exam  Preoperative diagnosis: r/o BCC  Risks, benefits and alternatives of procedure discussed and written informed consent obtained. Time out completed. Area of biopsy prepped with alcohol. Anesthesia with 1% lidocaine with epinephrine administered with 30 gauge needle. Shave biopsy of the site performed. Hemostasis achieved with pressure and hyfrecator. Vaseline applied to wound with bandage. Patient tolerated procedure well and there were no complications. The specimen was sent to the pathology lab by the staff. Wound care was discussed.  - PATHOLOGY SPECIMEN; Future    2. Actinic keratoses  CRYOTHERAPY:  Risks (including, but not limited to: hypo or hyperpigmentation, redness, blister, blood blister, recurrence, need for further treatment, infection, scar) and benefits of cryotherapy discussed. Patient verbally agreed to proceed with treatment. 2 cryotherapy freeze thaw cycles of 10 seconds were applied to 2 lesions on the face, arm with cryac. Patient tolerated procedure well. Aftercare instructions given.    3. EIC (epidermal inclusion cyst) vs pilomatricoma vs calcified foreign body vs other  - Benign-appearing nature of lesion discussed. Advised to return to clinic for any new or concerning changes.      4. Multiple nevi  - Benign-appearing nature of lesions discussed. Advised to return to clinic for any new or concerning changes.  - ABCDE's of melanoma discussed    5. History of basal cell carcinoma  Skin cancer education  - discussed importance of sun protective clothing, eyewear  - discussed importance of daily use of broad spectrum sunscreen with SPF 30 or greater, as well as need for  reapplication ~every 2 hours when exposed to UVR  - discussed importance of regular self-exams, ideally once per month, every 6 months exams in clinic  - ABCDE's of melanoma discussed  - patient to bring any new or concerning lesions to my attention      Followup: Return in about 6 months (around 4/16/2019) for sukumar, sooner pending results.    Robyn Tom M.D.

## 2018-10-17 LAB — PATHOLOGY CONSULT NOTE: NORMAL

## 2018-11-16 ENCOUNTER — OCCUPATIONAL MEDICINE (OUTPATIENT)
Dept: OCCUPATIONAL MEDICINE | Facility: CLINIC | Age: 53
End: 2018-11-16
Payer: COMMERCIAL

## 2018-11-16 VITALS
TEMPERATURE: 97.3 F | WEIGHT: 232 LBS | HEIGHT: 66 IN | BODY MASS INDEX: 37.28 KG/M2 | OXYGEN SATURATION: 96 % | DIASTOLIC BLOOD PRESSURE: 80 MMHG | SYSTOLIC BLOOD PRESSURE: 112 MMHG | HEART RATE: 69 BPM

## 2018-11-16 DIAGNOSIS — Z02.1 PRE-EMPLOYMENT DRUG SCREENING: ICD-10-CM

## 2018-11-16 DIAGNOSIS — S80.01XA CONTUSION OF RIGHT KNEE, INITIAL ENCOUNTER: ICD-10-CM

## 2018-11-16 DIAGNOSIS — S43.401A SPRAIN OF RIGHT SHOULDER, UNSPECIFIED SHOULDER SPRAIN TYPE, INITIAL ENCOUNTER: ICD-10-CM

## 2018-11-16 DIAGNOSIS — S63.502A SPRAIN OF LEFT WRIST, INITIAL ENCOUNTER: ICD-10-CM

## 2018-11-16 DIAGNOSIS — Z02.83 ENCOUNTER FOR DRUG SCREENING: ICD-10-CM

## 2018-11-16 LAB
AMP AMPHETAMINE: NORMAL
BAR BARBITURATES: NORMAL
BREATH ALCOHOL COMMENT: NORMAL
BZO BENZODIAZEPINES: NORMAL
COC COCAINE: NORMAL
INT CON NEG: NORMAL
INT CON POS: NORMAL
MDMA ECSTASY: NORMAL
MET METHAMPHETAMINES: NORMAL
MTD METHADONE: NORMAL
OPI OPIATES: NORMAL
OXY OXYCODONE: NORMAL
PCP PHENCYCLIDINE: NORMAL
POC BREATHALIZER: 0 PERCENT (ref 0–0.01)
POC URINE DRUG SCREEN OCDRS: NORMAL
THC: NORMAL

## 2018-11-16 PROCEDURE — 80305 DRUG TEST PRSMV DIR OPT OBS: CPT | Performed by: PREVENTIVE MEDICINE

## 2018-11-16 PROCEDURE — 99203 OFFICE O/P NEW LOW 30 MIN: CPT | Performed by: PREVENTIVE MEDICINE

## 2018-11-16 PROCEDURE — 82075 ASSAY OF BREATH ETHANOL: CPT | Performed by: PREVENTIVE MEDICINE

## 2018-11-16 NOTE — LETTER
"EMPLOYEE’S CLAIM FOR COMPENSATION/ REPORT OF INITIAL TREATMENT  FORM C-4    EMPLOYEE’S CLAIM - PROVIDE ALL INFORMATION REQUESTED   First Name  Alexandra Last Name  Aleja Birthdate                    1965                Sex  female Claim Number   Home Address  [address] Age  53 y.o. 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 (if applicable)  Walking from Car    How did this injury or occupational disease occur? (Be specific an answer in detail. Use additional sheet if necessary)  Walking on sidewalk I either tripped, stumbled feel and landed on palms, r knee, and r shoulder   If you believe that you have an occupational disease, when did you first have knowledge of the disability and it relationship to your employment?  N/A Witnesses to the Accident  Bonnie Monsalve      Nature of Injury or Occupational Disease  Sprain  Part(s) of Body Injured or Affected  Hand (L), Knee (L), Shoulder (R)    I certify that the above is true and correct to the best of my knowledge and that I have provided this information in order " to obtain the benefits of [place]  I hereby authorize any physician, chiropractor, surgeon, practitioner, or other person, any hospital, including [facility] or [facility] hospital, any medical service organization, any insurance company, or other institution or organization to release to each other, any medical or other information, including benefits paid or payable, pertinent to this injury or disease, except information relative to diagnosis, treatment and/or counseling for AIDS, psychological conditions, alcohol or controlled substances, for which I must give specific authorization.  A Photostat of this authorization shall be as valid as the original.     Date   Place   Employee’s Signature   THIS REPORT MUST BE COMPLETED AND MAILED WITHIN 3 WORKING DAYS OF TREATMENT   Place  [facility]   Date  11/16/2018 Diagnosis  (Z02.83) Encounter for drug screening  (Z02.1) Pre-employment drug screening  (S63.502A) Sprain of left wrist, initial encounter  (S43.401A) Sprain of right shoulder, unspecified shoulder sprain type, initial encounter  (S80.01XA) Contusion of right knee, initial encounter Is there evidence the injured employee was under the influence of alcohol and/or another controlled substance at the time of accident?   Hour  1:50 PM Description of Injury or Disease  Diagnoses of Encounter for drug screening, Pre-employment drug screening, Sprain of left wrist, initial encounter, Sprain of right shoulder, unspecified shoulder sprain type, initial encounter, and Contusion of right knee, initial encounter were pertinent to this visit. No   Treatment  OTC Aleve, ice, heat  Have you advised the patient to remain off work five days or more? No   X-Ray Findings    Comments:Pending   If Yes   From Date  To Date      From information given by the  "employee, together with medical evidence, can you directly connect this injury or occupational disease as job incurred?    Comments:ground-level fall without hazard, will await final determination from insurance If No Full Duty  Yes Modified Duty      Is additional medical care by a physician indicated?  Yes If Modified Duty, Specify any Limitations / Restrictions      Do you know of any previous injury or disease contributing to this condition or occupational disease?                            No   Date  11/16/2018 Print Doctor’s Name Eliu Victor M.D. I certify the employer’s copy of  this form was mailed on:   Address  [address] [place] [address],   [address] Insurer’s Use Only     [facility] Zip  [address]    Provider’s Tax ID Number  [id number] Telephone  Dept: [phone]        e-ELIU Gurrola M.D.   e-Signature: Dr. Shawn Ramirez, Medical Director Degree  MD        ORIGINAL-TREATING PHYSICIAN OR CHIROPRACTOR    PAGE 2-INSURER/TPA    PAGE 3-EMPLOYER    PAGE 4-EMPLOYEE             Form C-4 (rev10/07)              BRIEF DESCRIPTION OF RIGHTS AND BENEFITS  (Pursuant to [place] 616[place])    Notice of Injury or Occupational Disease (Incident Report Form C-1): If an injury or occupational disease (OD) arises out of and in the  course of employment, you must provide written notice to your employer as soon as practicable, but no later than 7 days after the accident or  OD. Your employer shall maintain a sufficient supply of the required forms.    Claim for Compensation (Form C-4): If medical treatment is sought, the form C-4 is available at the place of initial treatment. A completed  \"Claim for Compensation\" (Form C-4) must be filed within 90 days after an accident or OD. The treating physician or chiropractor must,  within 3 working days after treatment, complete and mail to the employer, the employer's insurer and third-party , the Claim for  Compensation.    Medical Treatment: If you " require medical treatment for your on-the-job injury or OD, you may be required to select a physician or  chiropractor from a list provided by your workers’ compensation insurer, if it has contracted with an Organization for Managed Care (MCO) or  Preferred Provider Organization (PPO) or providers of health care. If your employer has not entered into a contract with an MCO or PPO, you  may select a physician or chiropractor from the Panel of Physicians and Chiropractors. Any medical costs related to your industrial injury or  OD will be paid by your insurer.    Temporary Total Disability (TTD): If your doctor has certified that you are unable to work for a period of at least 5 consecutive days, or 5  cumulative days in a 20-day period, or places restrictions on you that your employer does not accommodate, you may be entitled to TTD  compensation.    Temporary Partial Disability (TPD): If the wage you receive upon reemployment is less than the compensation for TTD to which you are  entitled, the insurer may be required to pay you TPD compensation to make up the difference. TPD can only be paid for a maximum of 24  months.    Permanent Partial Disability (PPD): When your medical condition is stable and there is an indication of a PPD as a result of your injury or  OD, within 30 days, your insurer must arrange for an evaluation by a rating physician or chiropractor to determine the degree of your PPD. The  amount of your PPD award depends on the date of injury, the results of the PPD evaluation and your age and wage.    Permanent Total Disability (PTD): If you are medically certified by a treating physician or chiropractor as permanently and totally disabled  and have been granted a PTD status by your insurer, you are entitled to receive monthly benefits not to exceed 66 2/3% of your average  monthly wage. The amount of your PTD payments is subject to reduction if you previously received a PPD award.    Vocational  Rehabilitation Services: You may be eligible for vocational rehabilitation services if you are unable to return to the job due to a  permanent physical impairment or permanent restrictions as a result of your injury or occupational disease.    Transportation and Per Angy Reimbursement: You may be eligible for travel expenses and per angy associated with medical treatment.    Reopening: You may be able to reopen your claim if your condition worsens after claim closure.    Appeal Process: If you disagree with a written determination issued by the insurer or the insurer does not respond to your request, you may  appeal to the Department of Administration, , by following the instructions contained in your determination letter. You must  appeal the determination within 70 days from the date of the determination letter at [address], [address], [place], [place]  [address], or [address], [address], [place], [place] [address]. If you disagree with the  decision, you may appeal to the  Department of Administration, . You must file your appeal within 30 days from the date of the  decision  letter at [address], [address], [place], [place] [address], or [address], [address], [place], [place] [address]. If you  disagree with a decision of an , you may file a petition for judicial review with the District Court. You must do so within 30  days of the Appeal Officer’s decision. You may be represented by an  at your own expense or you may contact the [facility] for possible  representation.    [place]  for Injured Workers (NAIW): If you disagree with a  decision, you may request that NAIW represent you  without charge at an  Hearing. For information regarding denial of benefits, you may contact the [facility] at: [address], [address], [place] [address], [phone],  or [address], [address], [place] [address], [phone]    To File a Complaint with the Division: If you wish to file a complaint with the  of the Division of Industrial Relations (DIR),  please contact the Workers’ Compensation Section, [address], [address], [place], [place] [address], telephone ([phone]) [phone], or  [address], [address], [place], [place] [address], telephone [phone].    For assistance with Workers’ Compensation Issues: you may contact the Office of the Governor Consumer Health Assistance, [address] [place] [address], [address], [place], [place] [address], Toll Free 1-782.442.9931, Web site: http://govcha.[facility].nv., E-mail  [email or web address].[email or web address].                                                                                                                                                                                                                                   __________________________________________________________________                                                                   _________________                Employee Name / Signature                                                                                                                                                       Date                                                                                                                                                                                                     D-2 (rev. 10/07)

## 2018-11-16 NOTE — LETTER
"EMPLOYEE’S CLAIM FOR COMPENSATION/ REPORT OF INITIAL TREATMENT  FORM C-4    EMPLOYEE’S CLAIM - PROVIDE ALL INFORMATION REQUESTED   First Name  Alexandra Last Name  Aleja Birthdate                    1965                Sex  female Claim Number   Home Address  [address] Age  53 y.o. 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 (if applicable)  Walking from Car    How did this injury or occupational disease occur? (Be specific an answer in detail. Use additional sheet if necessary)  Walking on sidewalk I either tripped, stumbled feel and landed on palms, r knee, and r shoulder   If you believe that you have an occupational disease, when did you first have knowledge of the disability and it relationship to your employment?  N/A Witnesses to the Accident  Bonnie Monsalve      Nature of Injury or Occupational Disease  Sprain  Part(s) of Body Injured or Affected  Hand (L), Knee (L), Shoulder (R)    I certify that the above is true and correct to the best of my knowledge and that I have provided this information in order to obtain the " benefits of [place]  I hereby authorize any physician, chiropractor, surgeon, practitioner, or other person, any hospital, including [facility] or [facility], any medical service organization, any insurance company, or other institution or organization to release to each other, any medical or other information, including benefits paid or payable, pertinent to this injury or disease, except information relative to diagnosis, treatment and/or counseling for AIDS, psychological conditions, alcohol or controlled substances, for which I must give specific authorization.  A Photostat of this authorization shall be as valid as the original.     Date   Place   Employee’s Signature   THIS REPORT MUST BE COMPLETED AND MAILED WITHIN 3 WORKING DAYS OF TREATMENT   Place  [facility]  Name of [facility]     Is there evidence the injured employee was under the influence of alcohol and/or another controlled substance at the time of accident?   Hour  1:50 PM Description of Injury or Disease   Sprain of left wrist, initial encounter, Sprain of right shoulder, unspecified shoulder sprain type, initial encounter, and Contusion of right knee, initial encounter were pertinent to this visit. No   Treatment  OTC Aleve, ice, heat  Have you advised the patient to remain off work five days or more? No   X-Ray Findings    Comments:Pending   If Yes   From Date  To Date      From information given by the employee, together with medical evidence, can you directly connect this injury or occupational disease as job incurred?    Comments:ground-level fall without hazard, will await final determination from insurance If No Full Duty  Yes Modified Duty      Is additional medical care by a physician indicated?  Yes If Modified Duty, Specify any Limitations / Restrictions      Do you know of any  "previous injury or disease contributing to this condition or occupational disease?                            No   Date  11/16/2018 Print Doctor’s Name Eliu Victor M.D. I certify the employer’s copy of  this form was mailed on:   Address  [address],   [address] Insurer’s Use Only     [place] [place] Zip  [address]    Provider’s Tax ID Number  [id number] Telephone  Dept: [phone]        e-ELIU Gurrola M.D.   e-Signature: Dr. Shawn Ramirez, Medical Director Degree  MD        ORIGINAL-TREATING PHYSICIAN OR CHIROPRACTOR    PAGE 2-INSURER/TPA    PAGE 3-EMPLOYER    PAGE 4-EMPLOYEE             Form C-4 (rev10/07)              BRIEF DESCRIPTION OF RIGHTS AND BENEFITS  (Pursuant to [place] 616C.050)    Notice of Injury or Occupational Disease (Incident Report Form C-1): If an injury or occupational disease (OD) arises out of and in the  course of employment, you must provide written notice to your employer as soon as practicable, but no later than 7 days after the accident or  OD. Your employer shall maintain a sufficient supply of the required forms.    Claim for Compensation (Form C-4): If medical treatment is sought, the form C-4 is available at the place of initial treatment. A completed  \"Claim for Compensation\" (Form C-4) must be filed within 90 days after an accident or OD. The treating physician or chiropractor must,  within 3 working days after treatment, complete and mail to the employer, the employer's insurer and third-party , the Claim for  Compensation.    Medical Treatment: If you require medical treatment for your on-the-job injury or OD, you may be required to select a physician or  chiropractor from a list provided by your workers’ compensation insurer, if it has contracted with an Organization for Managed Care (MCO) or  Preferred Provider Organization (PPO) or providers of health care. If your employer has not entered into a contract with an MCO or PPO, you  may " select a physician or chiropractor from the Panel of Physicians and Chiropractors. Any medical costs related to your industrial injury or  OD will be paid by your insurer.    Temporary Total Disability (TTD): If your doctor has certified that you are unable to work for a period of at least 5 consecutive days, or 5  cumulative days in a 20-day period, or places restrictions on you that your employer does not accommodate, you may be entitled to TTD  compensation.    Temporary Partial Disability (TPD): If the wage you receive upon reemployment is less than the compensation for TTD to which you are  entitled, the insurer may be required to pay you TPD compensation to make up the difference. TPD can only be paid for a maximum of 24  months.    Permanent Partial Disability (PPD): When your medical condition is stable and there is an indication of a PPD as a result of your injury or  OD, within 30 days, your insurer must arrange for an evaluation by a rating physician or chiropractor to determine the degree of your PPD. The  amount of your PPD award depends on the date of injury, the results of the PPD evaluation and your age and wage.    Permanent Total Disability (PTD): If you are medically certified by a treating physician or chiropractor as permanently and totally disabled  and have been granted a PTD status by your insurer, you are entitled to receive monthly benefits not to exceed 66 2/3% of your average  monthly wage. The amount of your PTD payments is subject to reduction if you previously received a PPD award.    Vocational Rehabilitation Services: You may be eligible for vocational rehabilitation services if you are unable to return to the job due to a  permanent physical impairment or permanent restrictions as a result of your injury or occupational disease.    Transportation and Per Angy Reimbursement: You may be eligible for travel expenses and per angy associated with medical treatment.    Reopening: You may  be able to reopen your claim if your condition worsens after claim closure.    Appeal Process: If you disagree with a written determination issued by the insurer or the insurer does not respond to your request, you may  appeal to the Department of Administration, , by following the instructions contained in your determination letter. You must  appeal the determination within 70 days from the date of the determination letter at [address], [address], [place], [place]  [address], or [address] [facility] [address], [place], [place] [address]. If you disagree with the  decision, you may appeal to the  Department of Administration, . You must file your appeal within 30 days from the date of the  decision  letter at [address], [address], [place], [place] [address], or [address] [facility] [address], [place], [place] [address]. If you  disagree with a decision of an , you may file a petition for judicial review with the District Court. You must do so within 30  days of the Appeal Officer’s decision. You may be represented by an  at your own expense or you may contact the [facility] for possible  representation.    [place]  for Injured Workers (NAIW): If you disagree with a  decision, you may request that NAIW represent you  without charge at an  Hearing. For information regarding denial of benefits, you may contact the [facility] at: [address], [address], [place] [address], [phone], or [address] [facility] [address] [facility] [address], [place] [address], [phone]    To File a Complaint with the Division: If you wish to file a complaint with the  of the Division of Industrial Relations (DIR),  please contact the Workers’ Compensation Section, [address], [address], [place], [place] [address], telephone [phone], or  [address], [place] [address], telephone [phone].    For assistance with Workers’ Compensation Issues: you may contact the Office of the Governor Consumer Health Assistance, [address] [place] [address], [address], [place], [place] [address], Toll Free 1-227.147.9478, Web site: http://Actionalitycha.[facility].nv., E-mail  [email or web address].[email or web address].                                                                                                                                                                                                                                   __________________________________________________________________                                                                   _________________                Employee Name / Signature                                                                                                                                                       Date                                                                                                                                                                                                     D-2 (rev. 10/07)

## 2018-11-16 NOTE — PROGRESS NOTES
"Subjective:      Alexandra Masterson is a 53 y.o. female who presents with Other (NEW 11/15/18- knee wrist - same- ROOM 16 )      Date of injury 11/15/2018.  Mechanism of injury- \"walking on sidewalk I either tripped, stumbled fell and landed on palms, right knee, and right shoulder\".  53-year-old worker seen for evaluation of multiple injuries to upper and lower extremities.  Her main complaints are left wrist pain, right shoulder pain, and right knee pain.  She is having quite a bit of pain with shoulder movement and left wrist movement.  Work ability is not substantially affected.     HPI    ROS  Comprehensive medical history form reviewed. Pertinent positives and negatives included in HPI.    PFSH: reviewed in Epic    PMH:  has a past medical history of Acquired hypothyroidism (9/21/2016); Anesthesia; Arthritis; Basal cell carcinoma (9/21/2016); Depression (9/21/2016); Diabetes (HCC); Essential hypertension (9/21/2016); Family history of melanoma (9/21/2016); GERD (gastroesophageal reflux disease); Heart burn; Hyperlipidemia (9/21/2016); Hypertension; Indigestion; Nasal drainage; Other specified disorder of intestines; Prediabetes (9/21/2016); Scarlet fever; Snoring; Substance abuse (HCC); Thyroid disease; Viral meningitis; and Vitamin D deficiency (9/21/2016).  MEDS:   Current Outpatient Prescriptions:   •  levothyroxine (SYNTHROID) 125 MCG Tab, TAKE 1 TABLET BY MOUTH ONCE DAILY IN THE MORNING ON AN EMPTY STOMACH, Disp: 90 Tab, Rfl: 2  •  buPROPion (WELLBUTRIN XL) 300 MG XL tablet, TAKE 1 TABLET BY MOUTH ONCE DAILY IN THE MORNING, Disp: 90 Tab, Rfl: 2  •  losartan-hydrochlorothiazide (HYZAAR) 100-25 MG per tablet, TAKE 1 TABLET BY MOUTH ONCE DAILY, Disp: 90 Tab, Rfl: 2  •  amLODIPine (NORVASC) 10 MG Tab, TAKE 1 TABLET BY MOUTH ONCE DAILY, Disp: 90 Tab, Rfl: 2  •  estradiol (ESTRACE) 0.5 MG tablet, TAKE ONE TABLET BY MOUTH ONCE DAILY, Disp: 90 Tab, Rfl: 2  •  nitrofurantoin monohydr macro (MACROBID) 100 MG " "Cap, Take 1 Cap by mouth 2 times a day. (Patient not taking: Reported on 8/15/2018), Disp: 14 Cap, Rfl: 0  •  citalopram (CELEXA) 40 MG Tab, Take 1 Tab by mouth every day., Disp: 90 Tab, Rfl: 3  •  vitamin D, Ergocalciferol, (DRISDOL) 75215 units Cap capsule, TAKE ONE CAPSULE BY MOUTH ONCE A WEEK, Disp: 4 Cap, Rfl: 6  •  albuterol 108 (90 Base) MCG/ACT Aero Soln inhalation aerosol, Inhale 2 Puffs by mouth every 6 hours as needed., Disp: 8.5 g, Rfl: 0  •  benzonatate (TESSALON) 200 MG capsule, Take 1 Cap by mouth 3 times a day as needed. (Patient not taking: Reported on 8/15/2018), Disp: 60 Cap, Rfl: 0  •  progesterone (PROMETRIUM) 100 MG Cap, TAKE ONE CAPSULE BY MOUTH ONCE DAILY, Disp: 30 Cap, Rfl: 6  •  Diclofenac Sodium 1 % Gel, Apply thin film affected area q 8 hours prn pain (Patient not taking: Reported on 8/15/2018), Disp: 80 g, Rfl: 2  •  fluticasone (FLONASE) 50 MCG/ACT nasal spray, USE TWO SPRAY(S) IN EACH NOSTRIL ONCE DAILY, Disp: 16 g, Rfl: 3  •  Loratadine (CLARITIN) 10 MG CAPS, Take  by mouth every day., Disp: , Rfl:   •  Multiple Vitamins-Minerals (MULTIVITAMIN PO), Take  by mouth every day., Disp: , Rfl:   ALLERGIES: No Known Allergies  SURGHX:   Past Surgical History:   Procedure Laterality Date   • THYROIDECTOMY TOTAL  8/13/2013    Performed by Adonay Vega M.D. at SURGERY SAME DAY [facility] ORS   • GYN SURGERY  2008    d and c   • GERMÁN BY LAPAROSCOPY  1994     SOCHX:  reports that she quit smoking about 3 years ago. Her smoking use included Cigarettes. She has a 7.50 pack-year smoking history. She has never used smokeless tobacco. She reports that she does not drink alcohol or use drugs.  Work Status: Works as [employer]  FH: No pertinent hereditary disorders.        Objective:     /80   Pulse 69   Temp 36.3 °C (97.3 °F)   Ht 1.676 m (5' 6\")   Wt 105.2 kg (232 lb)   SpO2 96%   BMI 37.45 kg/m²      Physical Exam    Appearance: Well-developed, well-nourished.   Mental " Status: Mood and Affect normal. Pleasant. Cooperative. Appropriate.   ENT: Oropharynx clear. Moist mucous membranes. Hearing normal.   Eyes: Pupils reactive. Conjunctiva normal. No scleral icterus.   Neck: Trachea Midline. No thyromegaly. No masses.  Cardiovascular: Normal rate. Regular rhythm. Normal heart sounds.   Chest: Effort normal. Breath sounds clear.   Skin: Skin is warm and dry. No rash.   Musculoskeletal: Left wrist/hand shows minimal abrasions without significant swelling or ecchymosis.  Pain on motion moderate.  Right shoulder shows tenderness best localized over the acromioclavicular joint.  There is limited range of motion in abduction due to pain.  Right knee shows a small abrasion anterior aspect of the joint.  No significant swelling.         Assessment/Plan:     1. Sprain of left wrist, initial encounter  2. Sprain of right shoulder, unspecified shoulder sprain type, initial encounter  3. Contusion of right knee, initial encounter  New to occupational health  OTC Aleve  Ice/heat  Left wrist cock-up splint  - DX-WRIST-COMPLETE 3+ LEFT; Future  - DX-SHOULDER 2+ RIGHT; Future  May remain at regular work  Recheck in 1 week

## 2018-11-16 NOTE — LETTER
"   [address],   [address] [place] [address]  Phone:  [phone] - Fax:  [phone]   [facility] Progress Report and Disability Certification  Date of Service: 11/16/2018   No Show:  No  Date / Time of Next Visit: 11/21/2018   Claim Information   Patient Name: Alexandra Masterson  Claim Number:     Employer: [employer] *** Date of Injury: 11/15/2018     Insurer / TPA: Workers Choice *** ID / SSN:     Occupation: PAR *** Diagnosis: Diagnoses of Encounter for drug screening, Pre-employment drug screening, Sprain of left wrist, initial encounter, Sprain of right shoulder, unspecified shoulder sprain type, initial encounter, and Contusion of right knee, initial encounter were pertinent to this visit.    Medical Information   Related to Industrial Injury?   Comments:Indeterminate, no hazard, will await final determination from insurance ***   Subjective Complaints:  Date of injury 11/15/2018.  Mechanism of injury- \"walking on sidewalk I either tripped, stumbled fell and landed on palms, right knee, and right shoulder\".  53-year-old worker seen for evaluation of multiple injuries to upper and lower extremities.  Her main complaints are left wrist pain, right shoulder pain, and right knee pain.  She is having quite a bit of pain with shoulder movement and left wrist movement.  Work ability is not substantially affected.   Objective Findings: Appearance: Well-developed, well-nourished.   Mental Status: Mood and Affect normal. Pleasant. Cooperative. Appropriate.   ENT: Oropharynx clear. Moist mucous membranes. Hearing normal.   Eyes: Pupils reactive. Conjunctiva normal. No scleral icterus.   Neck: Trachea Midline. No thyromegaly. No masses.  Cardiovascular: Normal rate. Regular rhythm. Normal heart sounds.   Chest: Effort normal. Breath sounds clear.   Skin: Skin is warm and dry. No rash.   Musculoskeletal: Left wrist/hand shows minimal abrasions " without significant swelling or ecchymosis.  Pain on motion moderate.  Right shoulder shows tenderness best localized over the acromioclavicular joint.  There is limited range of motion in abduction due to pain.  Right knee shows a small abrasion anterior aspect of the joint.  No significant swelling.     Pre-Existing Condition(s):     Assessment:   Initial Visit    Status: Additional Care Required  Permanent Disability:No    Plan: Medication    Diagnostics: X-ray    Comments:  OTC Aleve, ice, heat    Disability Information   Status: Released to Full Duty    From:  11/16/2018  Through: 11/21/2018 Restrictions are:     Physical Restrictions   Sitting:    Standing:    Stooping:    Bending:      Squatting:    Walking:    Climbing:    Pushing:      Pulling:    Other:    Reaching Above Shoulder (L):   Reaching Above Shoulder (R):       Reaching Below Shoulder (L):    Reaching Below Shoulder (R):      Not to exceed Weight Limits   Carrying(hrs):   Weight Limit(lb):   Lifting(hrs):   Weight  Limit(lb):     Comments:      Repetitive Actions   Hands: i.e. Fine Manipulations from Grasping:     Feet: i.e. Operating Foot Controls:     Driving / Operate Machinery:     Physician Name: Eliu Victor M.D. Physician Signature: ELIU Atkins M.D. e-Signature: Dr. Shawn Ramirez, Medical Director   Clinic Name / Location: [address],   [address] [place] [address] Clinic Phone Number: Dept: [phone]   Appointment Time: 1:30 Pm Visit Start Time: 1:50 PM   Check-In Time:  1:34 Pm Visit Discharge Time:  ***   Original-Treating Physician or Chiropractor    Page 2-Insurer/TPA    Page 3-Employer    Page 4-Employee

## 2018-11-16 NOTE — LETTER
"[address],   [address] [place] [address]  Phone:  [phone] - Fax:  [phone]   Occupational Health [facility] Progress Report and Disability Certification  Date of Service: 11/16/2018   No Show:  No  Date / Time of Next Visit: 11/21/2018 @ 2:45 PM   Claim Information   Patient Name: Alexandra Masterson  Claim Number:     Employer: [employer]  Date of Injury: 11/15/2018     Insurer / TPA: Workers Choice  ID / SSN:     Occupation: PAR  Diagnosis:  Sprain of left wrist, initial encounter, Sprain of right shoulder, unspecified shoulder sprain type, initial encounter, and Contusion of right knee, initial encounter were pertinent to this visit.    Medical Information   Related to Industrial Injury?   Comments:Indeterminate, no hazard, will await final determination from insurance    Subjective Complaints:  Date of injury 11/15/2018.  Mechanism of injury- \"walking on sidewalk I either tripped, stumbled fell and landed on palms, right knee, and right shoulder\".  53-year-old worker seen for evaluation of multiple injuries to upper and lower extremities.  Her main complaints are left wrist pain, right shoulder pain, and right knee pain.  She is having quite a bit of pain with shoulder movement and left wrist movement.  Work ability is not substantially affected.   Objective Findings: Appearance: Well-developed, well-nourished.   Mental Status: Mood and Affect normal. Pleasant. Cooperative. Appropriate.   ENT: Oropharynx clear. Moist mucous membranes. Hearing normal.   Eyes: Pupils reactive. Conjunctiva normal. No scleral icterus.   Neck: Trachea Midline. No thyromegaly. No masses.  Cardiovascular: Normal rate. Regular rhythm. Normal heart sounds.   Chest: Effort normal. Breath sounds clear.   Skin: Skin is warm and dry. No rash.   Musculoskeletal: Left wrist/hand shows minimal abrasions without significant swelling or ecchymosis.  Pain on motion moderate.  Right " shoulder shows tenderness best localized over the acromioclavicular joint.  There is limited range of motion in abduction due to pain.  Right knee shows a small abrasion anterior aspect of the joint.  No significant swelling.     Pre-Existing Condition(s):     Assessment:   Initial Visit    Status: Additional Care Required  Permanent Disability:No    Plan: Medication    Diagnostics: X-ray    Comments:  OTC Aleve, ice, heat    Disability Information   Status: Released to Full Duty    From:  11/16/2018  Through: 11/21/2018 Restrictions are:     Physical Restrictions   Sitting:    Standing:    Stooping:    Bending:      Squatting:    Walking:    Climbing:    Pushing:      Pulling:    Other:    Reaching Above Shoulder (L):   Reaching Above Shoulder (R):       Reaching Below Shoulder (L):    Reaching Below Shoulder (R):      Not to exceed Weight Limits   Carrying(hrs):   Weight Limit(lb):   Lifting(hrs):   Weight  Limit(lb):     Comments:      Repetitive Actions   Hands: i.e. Fine Manipulations from Grasping:     Feet: i.e. Operating Foot Controls:     Driving / Operate Machinery:     Physician Name: Eliu Victor M.D. Physician Signature: ELIU Atkins M.D. e-Signature: Dr. Shawn Ramirez, Medical Director   Clinic Name / Location: [address],   [address] [place] [address] Clinic Phone Number: Dept: [phone]   Appointment Time: 1:30 Pm Visit Start Time: 1:50 PM   Check-In Time:  1:34 Pm Visit Discharge Time:  3:08 PM   Original-Treating Physician or Chiropractor    Page 2-Insurer/TPA    Page 3-Employer    Page 4-Employee

## 2018-11-20 ENCOUNTER — TELEPHONE (OUTPATIENT)
Dept: OCCUPATIONAL MEDICINE | Facility: CLINIC | Age: 53
End: 2018-11-20

## 2018-11-21 ENCOUNTER — OCCUPATIONAL MEDICINE (OUTPATIENT)
Dept: OCCUPATIONAL MEDICINE | Facility: CLINIC | Age: 53
End: 2018-11-21
Payer: COMMERCIAL

## 2018-11-21 VITALS
DIASTOLIC BLOOD PRESSURE: 68 MMHG | TEMPERATURE: 97.9 F | SYSTOLIC BLOOD PRESSURE: 106 MMHG | HEIGHT: 66 IN | WEIGHT: 231 LBS | BODY MASS INDEX: 37.12 KG/M2 | OXYGEN SATURATION: 92 % | HEART RATE: 92 BPM

## 2018-11-21 DIAGNOSIS — S80.01XD CONTUSION OF RIGHT KNEE, SUBSEQUENT ENCOUNTER: ICD-10-CM

## 2018-11-21 DIAGNOSIS — S43.401D SPRAIN OF RIGHT SHOULDER, UNSPECIFIED SHOULDER SPRAIN TYPE, SUBSEQUENT ENCOUNTER: ICD-10-CM

## 2018-11-21 DIAGNOSIS — S63.502D SPRAIN OF LEFT WRIST, SUBSEQUENT ENCOUNTER: ICD-10-CM

## 2018-11-21 PROCEDURE — 99212 OFFICE O/P EST SF 10 MIN: CPT | Performed by: PREVENTIVE MEDICINE

## 2018-11-21 NOTE — LETTER
"[address],   [address] [place] [address]  Phone:  [phone] - Fax:  [phone]   [facility] Progress Report and Disability Certification  Date of Service: 11/21/2018   No Show:  No  Date / Time of Next Visit:  Discharged   Claim Information   Patient Name: Alexandra Masterson  Claim Number:     Employer: [employer]  Date of Injury: 11/15/2018     Insurer / TPA: [employer]  ID / SSN:     Occupation: PAR  Diagnosis: Diagnoses of Sprain of left wrist, subsequent encounter, Contusion of right knee, subsequent encounter, and Sprain of right shoulder, unspecified shoulder sprain type, subsequent encounter were pertinent to this visit.    Medical Information   Related to Industrial Injury?      Subjective Complaints:  Date of injury 11/15/2018.  Mechanism of injury- \"walking on sidewalk I either tripped, stumbled fell and landed on palms, right knee, and right shoulder\".  53-year-old worker seen for follow-up of right shoulder strain, left wrist sprain, and right knee contusion.  She indicates she is much improved with minimal residual wrist discomfort.    Objective Findings: Appearance: Well-developed, well-nourished.   Mental Status: Mood and Affect normal. Pleasant. Cooperative. Appropriate. .   Musculoskeletal: Right shoulder shows good range of motion.  Left wrist shows no swelling or ecchymosis.  Good range of motion.     Pre-Existing Condition(s):     Assessment:   Condition Improved    Status: Discharged /  MMI  Permanent Disability:No    Plan:      Diagnostics:      Comments:       Disability Information   Status: Released to Full Duty    From:  11/21/2018  Through:   Restrictions are:     Physical Restrictions   Sitting:    Standing:    Stooping:    Bending:      Squatting:    Walking:    Climbing:    Pushing:      Pulling:    Other:    Reaching Above Shoulder (L):   Reaching Above Shoulder (R):       Reaching Below Shoulder " (L):    Reaching Below Shoulder (R):      Not to exceed Weight Limits   Carrying(hrs):   Weight Limit(lb):   Lifting(hrs):   Weight  Limit(lb):     Comments:      Repetitive Actions   Hands: i.e. Fine Manipulations from Grasping:     Feet: i.e. Operating Foot Controls:     Driving / Operate Machinery:     Physician Name: Eliu Victor M.D. Physician Signature: ELIU Atkins M.D. e-Signature: Dr. Shawn Ramirez, Medical Director   Clinic Name / Location: [address],   [address]  [place] [address] Clinic Phone Number: Dept: [phone]   Appointment Time: 10:25 Am Visit Start Time: 10:25 AM   Check-In Time:  10:20 Am Visit Discharge Time:  10:49 AM   Original-Treating Physician or Chiropractor    Page 2-Insurer/TPA    Page 3-Employer    Page 4-Employee

## 2018-11-21 NOTE — PROGRESS NOTES
"Subjective:      Alexandra Masterson is a 53 y.o. female who presents with Other (WC DOI: 11/15/18- knee wrist - better-)      Date of injury 11/15/2018.  Mechanism of injury- \"walking on sidewalk I either tripped, stumbled fell and landed on palms, right knee, and right shoulder\".  53-year-old worker seen for follow-up of right shoulder strain, left wrist sprain, and right knee contusion.  She indicates she is much improved with minimal residual wrist discomfort.      HPI    ROS  PFSH:  WORK STATUS: Restricted activity  PMH:  has a past medical history of Acquired hypothyroidism (9/21/2016); Anesthesia; Arthritis; Basal cell carcinoma (9/21/2016); Depression (9/21/2016); Diabetes (HCC); Essential hypertension (9/21/2016); Family history of melanoma (9/21/2016); GERD (gastroesophageal reflux disease); Heart burn; Hyperlipidemia (9/21/2016); Hypertension; Indigestion; Nasal drainage; Other specified disorder of intestines; Prediabetes (9/21/2016); Scarlet fever; Snoring; Substance abuse (HCC); Thyroid disease; Viral meningitis; and Vitamin D deficiency (9/21/2016).  MEDS:   Current Outpatient Prescriptions:   •  levothyroxine (SYNTHROID) 125 MCG Tab, TAKE 1 TABLET BY MOUTH ONCE DAILY IN THE MORNING ON AN EMPTY STOMACH, Disp: 90 Tab, Rfl: 2  •  buPROPion (WELLBUTRIN XL) 300 MG XL tablet, TAKE 1 TABLET BY MOUTH ONCE DAILY IN THE MORNING, Disp: 90 Tab, Rfl: 2  •  losartan-hydrochlorothiazide (HYZAAR) 100-25 MG per tablet, TAKE 1 TABLET BY MOUTH ONCE DAILY, Disp: 90 Tab, Rfl: 2  •  amLODIPine (NORVASC) 10 MG Tab, TAKE 1 TABLET BY MOUTH ONCE DAILY, Disp: 90 Tab, Rfl: 2  •  estradiol (ESTRACE) 0.5 MG tablet, TAKE ONE TABLET BY MOUTH ONCE DAILY, Disp: 90 Tab, Rfl: 2  •  nitrofurantoin monohydr macro (MACROBID) 100 MG Cap, Take 1 Cap by mouth 2 times a day. (Patient not taking: Reported on 8/15/2018), Disp: 14 Cap, Rfl: 0  •  citalopram (CELEXA) 40 MG Tab, Take 1 Tab by mouth every day., Disp: 90 Tab, Rfl: 3  •  vitamin D, " "Ergocalciferol, (DRISDOL) 36441 units Cap capsule, TAKE ONE CAPSULE BY MOUTH ONCE A WEEK, Disp: 4 Cap, Rfl: 6  •  albuterol 108 (90 Base) MCG/ACT Aero Soln inhalation aerosol, Inhale 2 Puffs by mouth every 6 hours as needed., Disp: 8.5 g, Rfl: 0  •  benzonatate (TESSALON) 200 MG capsule, Take 1 Cap by mouth 3 times a day as needed. (Patient not taking: Reported on 8/15/2018), Disp: 60 Cap, Rfl: 0  •  progesterone (PROMETRIUM) 100 MG Cap, TAKE ONE CAPSULE BY MOUTH ONCE DAILY, Disp: 30 Cap, Rfl: 6  •  Diclofenac Sodium 1 % Gel, Apply thin film affected area q 8 hours prn pain (Patient not taking: Reported on 8/15/2018), Disp: 80 g, Rfl: 2  •  fluticasone (FLONASE) 50 MCG/ACT nasal spray, USE TWO SPRAY(S) IN EACH NOSTRIL ONCE DAILY, Disp: 16 g, Rfl: 3  •  Loratadine (CLARITIN) 10 MG CAPS, Take  by mouth every day., Disp: , Rfl:   •  Multiple Vitamins-Minerals (MULTIVITAMIN PO), Take  by mouth every day., Disp: , Rfl:        Objective:     /68 (BP Location: Right arm, Patient Position: Sitting)   Pulse 92   Temp 36.6 °C (97.9 °F)   Ht 1.676 m (5' 6\")   Wt 104.8 kg (231 lb)   SpO2 92%   BMI 37.28 kg/m²      Physical Exam    Appearance: Well-developed, well-nourished.   Mental Status: Mood and Affect normal. Pleasant. Cooperative. Appropriate. .   Musculoskeletal: Right shoulder shows good range of motion.  Left wrist shows no swelling or ecchymosis.  Good range of motion.         Assessment/Plan:     1. Sprain of left wrist, subsequent encounter  2. Contusion of right knee, subsequent encounter  3. Sprain of right shoulder, unspecified shoulder sprain type, subsequent encounter  Conditions improved  Regular work  Released from care      "

## 2018-12-03 ENCOUNTER — HOSPITAL ENCOUNTER (OUTPATIENT)
Facility: MEDICAL CENTER | Age: 53
End: 2018-12-03
Attending: DERMATOLOGY
Payer: COMMERCIAL

## 2018-12-03 ENCOUNTER — OFFICE VISIT (OUTPATIENT)
Dept: DERMATOLOGY | Facility: IMAGING CENTER | Age: 53
End: 2018-12-03
Payer: COMMERCIAL

## 2018-12-03 VITALS
SYSTOLIC BLOOD PRESSURE: 108 MMHG | WEIGHT: 225 LBS | DIASTOLIC BLOOD PRESSURE: 72 MMHG | HEIGHT: 66 IN | TEMPERATURE: 98.8 F | BODY MASS INDEX: 36.16 KG/M2

## 2018-12-03 DIAGNOSIS — C44.619 BASAL CELL CARCINOMA OF LEFT SHOULDER: ICD-10-CM

## 2018-12-03 PROCEDURE — 11402 EXC TR-EXT B9+MARG 1.1-2 CM: CPT | Performed by: DERMATOLOGY

## 2018-12-03 PROCEDURE — 88305 TISSUE EXAM BY PATHOLOGIST: CPT

## 2018-12-03 PROCEDURE — 12032 INTMD RPR S/A/T/EXT 2.6-7.5: CPT | Performed by: DERMATOLOGY

## 2018-12-03 NOTE — PROGRESS NOTES
"PROCEDURE NOTE:    MALIGNANT LESION - EXCISION    After patient received diagnosis of basal cell carcinoma, superficial type (and multifocal), further management was discussed, including Mohs vs wide local excision vs radiation therapy vs curettage & electrodesiccation (C&ED) vs topical creams vs cryotherapy. Patient opted for excision. Risks, benefits and alternatives of procedure, including, but not limited to scar, bleeding, pain, infection, nerve damage, recurrence of tumor, failed surgery, and need for further surgery, were discussed and written informed consent obtained. Correct site was verified by patient and myself, and verbal time out completed.     Allergies reviewed: No  Pacemaker/defibrillator: No  Artificial joints: No  Antibiotics given: No    Pre-op diagnosis:BCC (requisition number: [id number])  Post-op diagnosis: Same  Site:left shoulder  Pre-op size: 10mm    Blood pressure 108/72, temperature 37.1 °C (98.8 °F), height 1.676 m (5' 6\"), weight 102.1 kg (225 lb), not currently breastfeeding.    Procedure: Area of surgery was prepped with alcohol, marked with 4mm margins, and with sterile marking pen. Anesthesia with 1% lidocaine with epinephrine administered with 30 gauge needle. The area was again cleaned with povidine-iodine swab. With sterile technique, a 15 blade scalpel was used to make a fusiform incision around the tumor to the level of the subcutaneous fat. The tumor was removed. Bleeding was minimal, and hemostasis was achieved with pressure, hyfrecation. Specimen was placed into biopsy container and sent to pathology by staff.    Intermediate closure:  Buried vertical mattress sutures were placed x 9 with 3.0 monocryl to close dead space. 4.0 prolene superficial interrupted sutures were placed x9  to approximate wound edge.  Vaseline applied to wound with bandage. Patient tolerated procedure well and there were no complications, blood loss was minimal.     Final wound size: 56mm    Bandage " was placed with vaseline, telfa, gauze and tape. Wound care was discussed with the patient, and written instructions were provided. Patient to return to clinic in 10-14 days for suture removal. Patient to call us if any problems or concerns with the procedure site arise prior to scheduled appointment.     Additional follow-up will be planned for 3 months for total skin exam    Robyn Tom M.D.

## 2018-12-04 LAB — PATHOLOGY CONSULT NOTE: NORMAL

## 2018-12-13 ENCOUNTER — OFFICE VISIT (OUTPATIENT)
Dept: URGENT CARE | Facility: CLINIC | Age: 53
End: 2018-12-13
Payer: COMMERCIAL

## 2018-12-13 ENCOUNTER — APPOINTMENT (OUTPATIENT)
Dept: DERMATOLOGY | Facility: IMAGING CENTER | Age: 53
End: 2018-12-13
Payer: COMMERCIAL

## 2018-12-13 VITALS
BODY MASS INDEX: 36.16 KG/M2 | OXYGEN SATURATION: 96 % | TEMPERATURE: 97.5 F | RESPIRATION RATE: 18 BRPM | DIASTOLIC BLOOD PRESSURE: 60 MMHG | WEIGHT: 225 LBS | HEART RATE: 78 BPM | SYSTOLIC BLOOD PRESSURE: 120 MMHG | HEIGHT: 66 IN

## 2018-12-13 DIAGNOSIS — H69.91 DYSFUNCTION OF RIGHT EUSTACHIAN TUBE: ICD-10-CM

## 2018-12-13 DIAGNOSIS — J34.89 SINUS PAIN: ICD-10-CM

## 2018-12-13 DIAGNOSIS — R51.9 SINUS HEADACHE: ICD-10-CM

## 2018-12-13 PROCEDURE — 99214 OFFICE O/P EST MOD 30 MIN: CPT | Performed by: FAMILY MEDICINE

## 2018-12-13 RX ORDER — AMOXICILLIN AND CLAVULANATE POTASSIUM 875; 125 MG/1; MG/1
1 TABLET, FILM COATED ORAL 2 TIMES DAILY
Qty: 20 TAB | Refills: 0 | Status: SHIPPED | OUTPATIENT
Start: 2018-12-13 | End: 2018-12-23

## 2018-12-13 ASSESSMENT — ENCOUNTER SYMPTOMS
EYES NEGATIVE: 1
ABDOMINAL PAIN: 0
VOMITING: 0
SORE THROAT: 0
RHINORRHEA: 0
STRIDOR: 0
CARDIOVASCULAR NEGATIVE: 1
FEVER: 0
COUGH: 0
CHILLS: 0
NECK PAIN: 0
SINUS PAIN: 1
HEADACHES: 1

## 2018-12-13 NOTE — PATIENT INSTRUCTIONS
Start oral antibiotics  Continue nasal steroids and claritin  Nasal saline spray  Follow up if not significantly improved as expected in 7-10 days, sooner if any worsening or new symptoms    If any worse, come back sooner  If you are not getting any better ENT referral recommended        Eustachian Tube Dysfunction  Introduction  The eustachian tube connects the middle ear to the back of the nose. It regulates air pressure in the middle ear by allowing air to move between the ear and nose. It also helps to drain fluid from the middle ear space. When the eustachian tube does not function properly, air pressure, fluid, or both can build up in the middle ear.  Eustachian tube dysfunction can affect one or both ears.  What are the causes?  This condition happens when the eustachian tube becomes blocked or cannot open normally. This may result from:  · Ear infections.  · Colds and other upper respiratory infections.  · Allergies.  · Irritation, such as from cigarette smoke or acid from the stomach coming up into the esophagus (gastroesophageal reflux).  · Sudden changes in air pressure, such as from descending in an airplane.  · Abnormal growths in the nose or throat, such as nasal polyps, tumors, or enlarged tissue at the back of the throat (adenoids).  What increases the risk?  This condition may be more likely to develop in people who smoke and people who are overweight. Eustachian tube dysfunction may also be more likely to develop in children, especially children who have:  · Certain birth defects of the mouth, such as cleft palate.  · Large tonsils and adenoids.  What are the signs or symptoms?  Symptoms of this condition may include:  · A feeling of fullness in the ear.  · Ear pain.  · Clicking or popping noises in the ear.  · Ringing in the ear.  · Hearing loss.  · Loss of balance.  Symptoms may get worse when the air pressure around you changes, such as when you travel to an area of high elevation or fly on an  "airplane.  How is this diagnosed?  This condition may be diagnosed based on:  · Your symptoms.  · A physical exam of your ear, nose, and throat.  · Tests, such as those that measure:  ¨ The movement of your eardrum (tympanogram).  ¨ Your hearing (audiometry).  How is this treated?  Treatment depends on the cause and severity of your condition. If your symptoms are mild, you may be able to relieve your symptoms by moving air into (\"popping\") your ears. If you have symptoms of fluid in your ears, treatment may include:  · Decongestants.  · Antihistamines.  · Nasal sprays or ear drops that contain medicines that reduce swelling (steroids).  In some cases, you may need to have a procedure to drain the fluid in your eardrum (myringotomy). In this procedure, a small tube is placed in the eardrum to:  · Drain the fluid.  · Restore the air in the middle ear space.  Follow these instructions at home:  · Take over-the-counter and prescription medicines only as told by your health care provider.  · Use techniques to help pop your ears as recommended by your health care provider. These may include:  ¨ Chewing gum.  ¨ Yawning.  ¨ Frequent, forceful swallowing.  ¨ Closing your mouth, holding your nose closed, and gently blowing as if you are trying to blow air out of your nose.  · Do not do any of the following until your health care provider approves:  ¨ Travel to high altitudes.  ¨ Fly in airplanes.  ¨ Work in a pressurized cabin or room.  ¨ Scuba dive.  · Keep your ears dry. Dry your ears completely after showering or bathing.  · Do not smoke.  · Keep all follow-up visits as told by your health care provider. This is important.  Contact a health care provider if:  · Your symptoms do not go away after treatment.  · Your symptoms come back after treatment.  · You are unable to pop your ears.  · You have:  ¨ A fever.  ¨ Pain in your ear.  ¨ Pain in your head or neck.  ¨ Fluid draining from your ear.  · Your hearing suddenly " changes.  · You become very dizzy.  · You lose your balance.  This information is not intended to replace advice given to you by your health care provider. Make sure you discuss any questions you have with your health care provider.  Document Released: 01/13/2017 Document Revised: 05/25/2017 Document Reviewed: 01/06/2016  © 2017 [employer]

## 2018-12-13 NOTE — PROGRESS NOTES
"Subjective:      Alexandra Masterson is a 53 y.o. female who presents with Otalgia (Right earache .)            Otalgia    There is pain in the right ear. Chronicity: past 2 weeks, feels popping sensation, also muffled sounds, denies any ringing sensation or balance problems.  She is also has had sinus headaches and also sinus pain. The problem occurs constantly. The problem has been unchanged. There has been no fever. The pain is moderate. Associated symptoms include headaches (sinus headaches). Pertinent negatives include no abdominal pain, coughing, ear discharge, hearing loss, neck pain, rhinorrhea, sore throat or vomiting. Treatments tried: she has been taking her nasal steroid spray, Claritin and also has used over-the-counter decongestant. The treatment provided no relief. There is no history of a chronic ear infection, hearing loss or a tympanostomy tube.   she denies any sinus surgeries or freq sinus infection. She has seasonal allergies    Review of Systems   Constitutional: Negative for chills and fever.   HENT: Positive for ear pain and sinus pain. Negative for ear discharge, hearing loss, rhinorrhea and sore throat.    Eyes: Negative.    Respiratory: Negative for cough and stridor.    Cardiovascular: Negative.    Gastrointestinal: Negative for abdominal pain and vomiting.   Musculoskeletal: Negative for neck pain.   Neurological: Positive for headaches (sinus headaches).          Objective:     /60 (BP Location: Left arm, Patient Position: Sitting, BP Cuff Size: Adult)   Pulse 78   Temp 36.4 °C (97.5 °F) (Temporal)   Resp 18   Ht 1.676 m (5' 6\")   Wt 102.1 kg (225 lb)   SpO2 96%   BMI 36.32 kg/m²      Physical Exam   Constitutional: She is oriented to person, place, and time. She appears well-developed and well-nourished.  Non-toxic appearance. She does not have a sickly appearance. She does not appear ill. No distress.   HENT:   Head: Normocephalic and atraumatic.   Right Ear: Tympanic " membrane, external ear and ear canal normal.   Left Ear: Tympanic membrane, external ear and ear canal normal.   Nose: No rhinorrhea. Right sinus exhibits maxillary sinus tenderness. Right sinus exhibits no frontal sinus tenderness. Left sinus exhibits maxillary sinus tenderness. Left sinus exhibits no frontal sinus tenderness.   Mouth/Throat: Uvula is midline and oropharynx is clear and moist. No trismus in the jaw. No uvula swelling. No oropharyngeal exudate, posterior oropharyngeal edema, posterior oropharyngeal erythema or tonsillar abscesses. No tonsillar exudate.   Eyes: Conjunctivae are normal.   Neck: Neck supple.   Cardiovascular: Normal rate.  Exam reveals no gallop and no friction rub.    No murmur heard.  Pulmonary/Chest: Effort normal. No stridor. No respiratory distress. She has no wheezes. She has no rales.   Lymphadenopathy:     She has no cervical adenopathy.   Neurological: She is alert and oriented to person, place, and time.   Skin: Skin is warm. No rash noted. She is not diaphoretic. No pallor.               Assessment/Plan:     1. Sinus pain  - amoxicillin-clavulanate (AUGMENTIN) 875-125 MG Tab; Take 1 Tab by mouth 2 times a day for 10 days.  Dispense: 20 Tab; Refill: 0    2. Sinus headache  - amoxicillin-clavulanate (AUGMENTIN) 875-125 MG Tab; Take 1 Tab by mouth 2 times a day for 10 days.  Dispense: 20 Tab; Refill: 0    3. Dysfunction of right eustachian tube      Likely Sinusitis plus eustachian tube dysfunction  Recommend to continue nasal steroid also Claritin   also discussed saline irrigation  Follow-up in 7-10 days if not significantly better, sooner if any worsening  Plan per orders and instructions  Warning signs reviewed

## 2019-01-17 ENCOUNTER — PATIENT MESSAGE (OUTPATIENT)
Dept: MEDICAL GROUP | Facility: MEDICAL CENTER | Age: 54
End: 2019-01-17

## 2019-01-25 ENCOUNTER — HOSPITAL ENCOUNTER (OUTPATIENT)
Dept: RADIOLOGY | Facility: MEDICAL CENTER | Age: 54
End: 2019-01-25
Attending: FAMILY MEDICINE
Payer: COMMERCIAL

## 2019-01-25 DIAGNOSIS — Z12.31 VISIT FOR SCREENING MAMMOGRAM: ICD-10-CM

## 2019-01-25 PROCEDURE — 77063 BREAST TOMOSYNTHESIS BI: CPT

## 2019-02-06 ENCOUNTER — PATIENT MESSAGE (OUTPATIENT)
Dept: MEDICAL GROUP | Facility: MEDICAL CENTER | Age: 54
End: 2019-02-06

## 2019-03-13 ENCOUNTER — OFFICE VISIT (OUTPATIENT)
Dept: MEDICAL GROUP | Facility: PHYSICIAN GROUP | Age: 54
End: 2019-03-13
Payer: COMMERCIAL

## 2019-03-13 VITALS
RESPIRATION RATE: 16 BRPM | BODY MASS INDEX: 37.12 KG/M2 | SYSTOLIC BLOOD PRESSURE: 128 MMHG | HEIGHT: 66 IN | WEIGHT: 231 LBS | HEART RATE: 70 BPM | OXYGEN SATURATION: 94 % | DIASTOLIC BLOOD PRESSURE: 80 MMHG | TEMPERATURE: 98.1 F

## 2019-03-13 DIAGNOSIS — Z78.0 MENOPAUSE: ICD-10-CM

## 2019-03-13 DIAGNOSIS — I10 ESSENTIAL HYPERTENSION: ICD-10-CM

## 2019-03-13 DIAGNOSIS — L29.9 ITCHING: Primary | ICD-10-CM

## 2019-03-13 DIAGNOSIS — F33.41 RECURRENT MAJOR DEPRESSIVE DISORDER, IN PARTIAL REMISSION (HCC): ICD-10-CM

## 2019-03-13 DIAGNOSIS — E89.0 POSTOPERATIVE HYPOTHYROIDISM: ICD-10-CM

## 2019-03-13 PROBLEM — M25.522 LEFT ELBOW PAIN: Status: RESOLVED | Noted: 2017-08-23 | Resolved: 2019-03-13

## 2019-03-13 PROBLEM — M25.561 ACUTE PAIN OF RIGHT KNEE: Status: RESOLVED | Noted: 2017-08-23 | Resolved: 2019-03-13

## 2019-03-13 PROBLEM — N76.1 SUBACUTE VAGINITIS: Status: RESOLVED | Noted: 2018-05-24 | Resolved: 2019-03-13

## 2019-03-13 PROCEDURE — 99214 OFFICE O/P EST MOD 30 MIN: CPT | Performed by: FAMILY MEDICINE

## 2019-03-13 ASSESSMENT — PAIN SCALES - GENERAL: PAINLEVEL: NO PAIN

## 2019-03-13 ASSESSMENT — PATIENT HEALTH QUESTIONNAIRE - PHQ9: CLINICAL INTERPRETATION OF PHQ2 SCORE: 0

## 2019-03-13 NOTE — PATIENT INSTRUCTIONS
For the itching:  - try the claritin every day to see if it helps  - if not, then switch zyrtec or allegra

## 2019-03-13 NOTE — ASSESSMENT & PLAN NOTE
This is a chronic condition.  Onset: many years  Current Meds: amlodipine 10 mg daily, losartan-HCTZ 100-25 mg daily  Side effects: none  Home BP Los-120s/60-70s  Associated symptoms: no cp, no sob, no dizziness/lightheadedness, no headaches

## 2019-03-13 NOTE — PROGRESS NOTES
Subjective:     CC:  Diagnoses of Itching, Postoperative hypothyroidism, Essential hypertension, Recurrent major depressive disorder, in partial remission (HCC), Menopause, EMRE (obstructive sleep apnea), and Prediabetes were pertinent to this visit.    HISTORY OF THE PRESENT ILLNESS: Patient is a 53 y.o. female. This pleasant patient is here today to establish care and discuss medication review. Her prior PCP was Esha Browne MD.    Itching  This is a new condition.  Onset: 1 year  Location: whole body - especially calves, lower abdomen, between breasts, and labia  Duration: constant  No associated rash or hives/welts. No change in soaps or detergents.      Postoperative hypothyroidism  This is a chronic condition. She had her thyroid removed in . Her dose hasn't changed since the surgery. Her last thyroid lab was 2017.    Essential hypertension  This is a chronic condition.  Onset: many years  Current Meds: amlodipine 10 mg daily, losartan-HCTZ 100-25 mg daily  Side effects: none  Home BP Los-120s/60-70s  Associated symptoms: no cp, no sob, no dizziness/lightheadedness, no headaches      Recurrent major depressive disorder, in partial remission (HCC)  This is a chronic condition.  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 She is currently on estrogen and progesterone to help control hot flashes. It controls her hot flashes fairly well. These were started .      Allergies: Patient has no known allergies.    Current Outpatient Prescriptions Ordered in [facility]   Medication Sig Dispense Refill   • estradiol (ESTRACE) 0.5 MG tablet TAKE 1 TABLET BY MOUTH ONCE DAILY 90 Tab 0   • progesterone (PROMETRIUM) 100 MG Cap TAKE ONE CAPSULE BY MOUTH ONCE DAILY 90  Cap 1   • levothyroxine (SYNTHROID) 125 MCG Tab TAKE 1 TABLET BY MOUTH ONCE DAILY IN THE MORNING ON AN EMPTY STOMACH 90 Tab 2   • buPROPion (WELLBUTRIN XL) 300 MG XL tablet TAKE 1 TABLET BY MOUTH ONCE DAILY IN THE MORNING 90 Tab 2   • losartan-hydrochlorothiazide (HYZAAR) 100-25 MG per tablet TAKE 1 TABLET BY MOUTH ONCE DAILY 90 Tab 2   • amLODIPine (NORVASC) 10 MG Tab TAKE 1 TABLET BY MOUTH ONCE DAILY 90 Tab 2   • citalopram (CELEXA) 40 MG Tab Take 1 Tab by mouth every day. 90 Tab 3   • fluticasone (FLONASE) 50 MCG/ACT nasal spray USE TWO SPRAY(S) IN EACH NOSTRIL ONCE DAILY 16 g 3   • Multiple Vitamins-Minerals (MULTIVITAMIN PO) Take  by mouth every day.     • albuterol 108 (90 Base) MCG/ACT Aero Soln inhalation aerosol Inhale 2 Puffs by mouth every 6 hours as needed. 8.5 g 0   • Loratadine (CLARITIN) 10 MG CAPS Take  by mouth every day.       No current [facility]-ordered facility-administered medications on file.        Past Medical History:   Diagnosis Date   • Acquired hypothyroidism 9/21/2016   • Anesthesia     nausea   • Arthritis     spine   • Basal cell carcinoma 9/21/2016   • Depression 9/21/2016   • Essential hypertension 9/21/2016   • Family history of melanoma 9/21/2016   • GERD (gastroesophageal reflux disease)    • Hyperlipidemia 9/21/2016   • Hypertension    • Nasal drainage    • Other specified disorder of intestines     diarrhea and constipation   • Prediabetes 9/21/2016   • Scarlet fever    • Snoring    • Substance abuse (HCC)     alcohol; quit 2011   • Thyroid disease    • Viral meningitis    • Vitamin D deficiency 9/21/2016       Past Surgical History:   Procedure Laterality Date   • THYROIDECTOMY TOTAL  8/13/2013    Performed by Adonay Vega M.D. at SURGERY SAME DAY [facility] ORS   • GYN SURGERY  2008    d and c   • GERMÁN BY LAPAROSCOPY  1994       Social History   Substance Use Topics   • Smoking status: Former Smoker     Packs/day: 0.50     Years: 15.00     Types: Cigarettes     Quit date:  "9/21/2015   • Smokeless tobacco: Never Used   • Alcohol use No      Comment: Sober since 2011       Social History     Social History Narrative   • No narrative on file       Family History   Problem Relation Age of Onset   • Dementia Mother    • Cancer Father         melanoma   • Cancer Brother         basal cell carcinomas   • Diabetes Maternal Grandfather    • Heart Disease Neg Hx    • Stroke Neg Hx        Health Maintenance: Completed    ROS:   Gen: no fevers/chills, no changes in weight  Eyes: no changes in vision  ENT: no sore throat  Pulm: no sob  CV: no chest pain  GI: no diarrhea  : no dysuria  MSk: no myalgias  Skin: no rash  Neuro: no numbness/tingling      Objective:     Exam: Blood pressure 128/80, pulse 70, temperature 36.7 °C (98.1 °F), temperature source Temporal, resp. rate 16, height 1.676 m (5' 6\"), weight 104.8 kg (231 lb), SpO2 94 %, not currently breastfeeding. Body mass index is 37.28 kg/m².    General: Normal appearing. No distress.  HEENT: Normocephalic. Eyes conjunctiva clear lids without ptosis, pupils equal and reactive to light accommodation, ears normal shape and contour, canals are clear bilaterally, tympanic membranes are benign, oropharynx is without erythema, edema or exudates.   Neck: Supple without JVD. Thyroid is not present.  Pulmonary: Clear to ausculation.  Normal effort. No rales, ronchi, or wheezing.  Cardiovascular: Regular rate and rhythm without murmur. Carotid and radial pulses are intact and equal bilaterally.  Abdomen: Soft, nontender, nondistended. Normal bowel sounds. Liver and spleen are not palpable  Neurologic: Grossly nonfocal  Lymph: No cervical or supraclavicular lymph nodes are palpable  Skin: Warm and dry.  No obvious lesions.  Musculoskeletal: Normal gait. No extremity cyanosis, clubbing, or edema.  Psych: Normal mood and affect. Alert and oriented x3. Judgment and insight is normal.    Assessment & Plan:   53 y.o. female with the following -    1. " Itching  This is a new condition.  She reports getting diffuse itching for the last year with no associated rash, hives, or welts.  The itching is especially present over her calves, lower abdomen, between her breasts, and on her labia.  Her previous PCP checked and did not find a yeast infection to cause itching in her labia.  She has not changed her soaps or detergents to lead to this itching.  She does use a lot of lotion during the winter so dry skin seems less likely.  After discussion today we decided to try using Claritin daily to see if that helps with the itching.  -Try Claritin daily  -If Claritin is helping but not completely resolving symptoms are not helping at all, she can try Zyrtec or Allegra    2. Postoperative hypothyroidism  This is a chronic condition.  She reports that she had a history of hyperthyroidism and underwent a surgical resection of her thyroid in 2013.  Since that time she had hypothyroidism and is on levothyroxine.  She reports that her dose of levothyroxine has not changed since the surgery and her labs were last checked in December, 2017.  - TSH WITH REFLEX TO FT4; Future  -Continue levothyroxine 125 mcg daily    3. Essential hypertension  This is a chronic condition, controlled.  She checks her blood pressure at work and reports getting values 110s-120/60s-70s.  Her blood pressure looks fairly well controlled today at her visit.  She denies any side effects with her current medications.  -Continue amlodipine 10 mg daily  -Continue losartan-Hydrocort thiazide 100-25 mill grams daily    4. Recurrent major depressive disorder, in partial remission (HCC)  This is a chronic condition.  She has had depression since 2007 and gets intermittent symptoms.  She reports no symptoms currently and her PHQ 2 score today is 0.  She denies any suicidal or homicidal ideation.  -Continue Wellbutrin 300 mg daily  -Continue citalopram 40 mg daily  -Can consider in the future dialing back on medication  to see if her depression returns as it has been well-controlled for several years    5. Menopause  This is a chronic condition.  He reports that her last menstrual period was a little over a year ago.  He has been on hormonal replacement therapy to treat hot flashes since 2014.  We discussed that generally try to limit hormonal placement therapy to 5 years.  As she is only been period free for 1 year I will likely lean towards continuing hormone replacement therapy for 1 more year and then consider discontinuing.  -Continue estradiol 0.5 mg daily  -Continue progesterone 100 mg daily    Return for Annual/wellness visit, Pap.    Please note that this dictation was created using voice recognition software. I have made every reasonable attempt to correct obvious errors, but I expect that there are errors of grammar and possibly content that I did not discover before finalizing the note.

## 2019-03-13 NOTE — ASSESSMENT & PLAN NOTE
This is a new condition.  Onset: 1 year  Location: whole body - especially calves, lower abdomen, between breasts, and labia  Duration: constant  No associated rash or hives/welts. No change in soaps or detergents.

## 2019-03-13 NOTE — ASSESSMENT & PLAN NOTE
This is a chronic condition. She is currently on estrogen and progesterone to help control hot flashes. It controls her hot flashes fairly well. These were started 2014.

## 2019-03-13 NOTE — ASSESSMENT & PLAN NOTE
This is a chronic condition.  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

## 2019-03-13 NOTE — ASSESSMENT & PLAN NOTE
This is a chronic condition. She had her thyroid removed in 2013. Her dose hasn't changed since the surgery. Her last thyroid lab was 12/2017.

## 2019-03-16 ENCOUNTER — HOSPITAL ENCOUNTER (OUTPATIENT)
Dept: LAB | Facility: MEDICAL CENTER | Age: 54
End: 2019-03-16
Attending: FAMILY MEDICINE
Payer: COMMERCIAL

## 2019-03-16 DIAGNOSIS — E89.0 POSTOPERATIVE HYPOTHYROIDISM: ICD-10-CM

## 2019-03-16 LAB — TSH SERPL DL<=0.005 MIU/L-ACNC: 0.81 UIU/ML (ref 0.38–5.33)

## 2019-03-16 PROCEDURE — 36415 COLL VENOUS BLD VENIPUNCTURE: CPT

## 2019-03-16 PROCEDURE — 84443 ASSAY THYROID STIM HORMONE: CPT

## 2019-03-18 RX ORDER — LEVOTHYROXINE SODIUM 0.12 MG/1
TABLET ORAL
Qty: 90 TAB | Refills: 3 | Status: SHIPPED | OUTPATIENT
Start: 2019-03-18 | End: 2020-05-15

## 2019-04-10 ENCOUNTER — HOSPITAL ENCOUNTER (OUTPATIENT)
Facility: MEDICAL CENTER | Age: 54
End: 2019-04-10
Attending: FAMILY MEDICINE
Payer: COMMERCIAL

## 2019-04-10 ENCOUNTER — OFFICE VISIT (OUTPATIENT)
Dept: MEDICAL GROUP | Facility: PHYSICIAN GROUP | Age: 54
End: 2019-04-10
Payer: COMMERCIAL

## 2019-04-10 VITALS
TEMPERATURE: 98.3 F | HEIGHT: 66 IN | WEIGHT: 229.2 LBS | SYSTOLIC BLOOD PRESSURE: 118 MMHG | RESPIRATION RATE: 16 BRPM | BODY MASS INDEX: 36.83 KG/M2 | HEART RATE: 74 BPM | OXYGEN SATURATION: 92 % | DIASTOLIC BLOOD PRESSURE: 72 MMHG

## 2019-04-10 DIAGNOSIS — Z01.419 WELL WOMAN EXAM: Primary | ICD-10-CM

## 2019-04-10 DIAGNOSIS — E66.9 OBESITY (BMI 30-39.9): ICD-10-CM

## 2019-04-10 DIAGNOSIS — Z11.59 NEED FOR HEPATITIS C SCREENING TEST: ICD-10-CM

## 2019-04-10 DIAGNOSIS — Z11.51 SCREENING FOR HPV (HUMAN PAPILLOMAVIRUS): ICD-10-CM

## 2019-04-10 DIAGNOSIS — Z11.4 SCREENING FOR HIV (HUMAN IMMUNODEFICIENCY VIRUS): ICD-10-CM

## 2019-04-10 DIAGNOSIS — Z12.4 CERVICAL CANCER SCREENING: ICD-10-CM

## 2019-04-10 PROBLEM — F10.11 ALCOHOL ABUSE, IN REMISSION: Status: ACTIVE | Noted: 2019-04-10

## 2019-04-10 PROCEDURE — 88175 CYTOPATH C/V AUTO FLUID REDO: CPT

## 2019-04-10 PROCEDURE — 99000 SPECIMEN HANDLING OFFICE-LAB: CPT | Performed by: FAMILY MEDICINE

## 2019-04-10 PROCEDURE — 99396 PREV VISIT EST AGE 40-64: CPT | Performed by: FAMILY MEDICINE

## 2019-04-10 PROCEDURE — 87624 HPV HI-RISK TYP POOLED RSLT: CPT

## 2019-04-10 ASSESSMENT — PAIN SCALES - GENERAL: PAINLEVEL: NO PAIN

## 2019-04-10 NOTE — PROGRESS NOTES
Subjective:     CC:   Chief Complaint   Patient presents with   • Gynecologic Exam       HPI:   Alexandra Masterson is a 53 y.o. female who presents for annual exam. She is feeling well and denies any complaints.    Ob-Gyn/ History:    Patient has GYN provider: no  /Para:  2/1  Last Pap Smear:  . No history of abnormal pap smears.  Gyn Surgery:  D&C.  Current Contraceptive Method:  Vasectomy. No currently sexually active.  Last menstrual period:  2017.  No significant bloating/fluid retention, pelvic pain, or dyspareunia. No vaginal discharge  Post-menopausal bleeding: none  Urinary incontinence: + stress  Folate intake: n/a     Health Maintenance  Advanced directive: n/a   Osteoporosis Screen/ DEXA: n/a   PT/vit D for falls prevention: n/a   Cholesterol Screenin - elevated T chol, LDL   Diabetes Screenin - normal   Aspirin Use: n/a - ASCVD 2.8%    Diet: limiting carbs   Exercise: spin 2x/week, walking   Substance Abuse: currently 8 years sober from alcohol  Safe in relationship.  Seat belts, bike helmet, gun safety discussed.  Sun protection used.    Cancer screening  Colorectal Cancer Screenin - due     Lung Cancer Screening: n/a  Cervical Cancer Screening: today   Breast Cancer Screenin2019 - normal     Infectious disease screening/Immunizations  --STI Screening: declined   --Practices safe sex.  --HIV Screening: ordered   --Hepatitis C Screening: ordered   --Immunizations:    Influenza: 2018    HPV:  n/a    Tetanus:     Shingles: due   Pneumococcal : n/a      She  has a past medical history of Acquired hypothyroidism (2016); Anesthesia; Arthritis; Basal cell carcinoma (2016); Depression (2016); Essential hypertension (2016); Family history of melanoma (2016); GERD (gastroesophageal reflux disease); Hyperlipidemia (2016); Hypertension; Nasal drainage; Other specified disorder of intestines; Prediabetes (2016); Scarlet fever;  Snoring; Substance abuse (HCC); Thyroid disease; Viral meningitis; and Vitamin D deficiency (9/21/2016).  She  has a past surgical history that includes bonny by laparoscopy (1994); gyn surgery (2008); and thyroidectomy total (8/13/2013).    Family History   Problem Relation Age of Onset   • Dementia Mother    • Cancer Father         melanoma   • Cancer Brother         basal cell carcinomas   • Diabetes Maternal Grandfather    • Heart Disease Neg Hx    • Stroke Neg Hx        Social History     Social History   • Marital status:      Spouse name: N/A   • Number of children: N/A   • Years of education: N/A     Occupational History   • Not on file.     Social History Main Topics   • Smoking status: Former Smoker     Packs/day: 0.50     Years: 15.00     Types: Cigarettes     Quit date: 9/21/2015   • Smokeless tobacco: Never Used   • Alcohol use No      Comment: Sober since 2011   • Drug use: No   • Sexual activity: Yes     Partners: Male     Birth control/ protection: Surgical     Other Topics Concern   • Not on file     Social History Narrative   • No narrative on file       Patient Active Problem List    Diagnosis Date Noted   • Alcohol abuse, in remission 04/10/2019   • Itching 03/13/2019   • Menopause 03/13/2019   • EMRE (obstructive sleep apnea) 06/27/2017   • Essential hypertension 09/21/2016   • Postoperative hypothyroidism 09/21/2016   • Recurrent major depressive disorder, in partial remission (HCC) 09/21/2016   • Vitamin D deficiency 09/21/2016   • Hyperlipidemia 09/21/2016   • Prediabetes 09/21/2016   • Obesity (BMI 30-39.9) 09/21/2016   • Basal cell carcinoma 09/21/2016   • Family history of melanoma 09/21/2016         Current Outpatient Prescriptions   Medication Sig Dispense Refill   • levothyroxine (SYNTHROID) 125 MCG Tab TAKE 1 TABLET BY MOUTH ONCE DAILY IN THE MORNING ON AN EMPTY STOMACH 90 Tab 3   • estradiol (ESTRACE) 0.5 MG tablet TAKE 1 TABLET BY MOUTH ONCE DAILY 90 Tab 0   • progesterone  "(PROMETRIUM) 100 MG Cap TAKE ONE CAPSULE BY MOUTH ONCE DAILY 90 Cap 1   • buPROPion (WELLBUTRIN XL) 300 MG XL tablet TAKE 1 TABLET BY MOUTH ONCE DAILY IN THE MORNING 90 Tab 2   • losartan-hydrochlorothiazide (HYZAAR) 100-25 MG per tablet TAKE 1 TABLET BY MOUTH ONCE DAILY 90 Tab 2   • amLODIPine (NORVASC) 10 MG Tab TAKE 1 TABLET BY MOUTH ONCE DAILY 90 Tab 2   • citalopram (CELEXA) 40 MG Tab Take 1 Tab by mouth every day. 90 Tab 3   • fluticasone (FLONASE) 50 MCG/ACT nasal spray USE TWO SPRAY(S) IN EACH NOSTRIL ONCE DAILY 16 g 3   • Loratadine (CLARITIN) 10 MG CAPS Take  by mouth every day.     • Multiple Vitamins-Minerals (MULTIVITAMIN PO) Take  by mouth every day.     • albuterol 108 (90 Base) MCG/ACT Aero Soln inhalation aerosol Inhale 2 Puffs by mouth every 6 hours as needed. 8.5 g 0     No current facility-administered medications for this visit.      No Known Allergies    Review of Systems   Constitutional: Negative for fever, chills.   HENT: Negative for congestion.    Eyes: Negative for pain.   Respiratory: Negative for shortness of breath.    Cardiovascular: Negative for leg swelling.   Gastrointestinal: Negative for nausea, vomiting.   Genitourinary: Negative for dysuria.   Skin: Negative for rash.   Neurological: Positive for headaches - occasional.  Endo/Heme/Allergies: Does not bruise/bleed easily.   Psychiatric/Behavioral: Negative for depression.  The patient is not nervous/anxious.      Objective:     /72 (BP Location: Left arm, Patient Position: Sitting, BP Cuff Size: Adult)   Pulse 74   Temp 36.8 °C (98.3 °F) (Temporal)   Resp 16   Ht 1.676 m (5' 6\")   Wt 104 kg (229 lb 3.2 oz)   SpO2 92%   Breastfeeding? No   BMI 36.99 kg/m²   Body mass index is 36.99 kg/m².  Wt Readings from Last 4 Encounters:   04/10/19 104 kg (229 lb 3.2 oz)   03/13/19 104.8 kg (231 lb)   12/13/18 102.1 kg (225 lb)   12/03/18 102.1 kg (225 lb)       Physical Exam:  Constitutional: Well-developed and well-nourished. " Not diaphoretic. No distress.   Skin: Skin is warm and dry. No rash noted.  Head: Atraumatic without lesions.  Eyes: Conjunctivae and extraocular motions are normal. Pupils are equal, round, and reactive to light. No scleral icterus.   Ears:  External ears unremarkable. Tympanic membranes clear and intact.  Mouth/Throat: Dentition is good. Tongue normal. Oropharynx is clear and moist. Posterior pharynx without erythema or exudates.  Neck: Supple, trachea midline. Normal range of motion. No thyromegaly present. No lymphadenopathy--cervical or supraclavicular.  Cardiovascular: Regular rate and rhythm, S1 and S2 without murmur, rubs, or gallops.  Lungs: Normal inspiratory effort, CTA bilaterally, no wheezes/rhonchi/rales  Abdomen: Soft, non tender, and without distention. Active bowel sounds in all four quadrants. No rebound, guarding, masses or HSM.  : Perineum and external genitalia normal without rash. Vagina with normal and physiologic discharge. Cervix without visible lesions or discharge.  Extremities: No cyanosis, clubbing, erythema, nor edema. Distal pulses intact and symmetric.   Musculoskeletal: All major joints AROM full in all directions without pain.  Neurological: Alert and oriented x 3. DTRs 2+/3 and symmetric. No cranial nerve deficit. 5/5 myotomes. Sensation intact. Negative Rhomberg.  Psychiatric:  Behavior, mood, and affect are appropriate.    A chaperone was offered to the patient during today's exam. Chaperone name: Jeannine Linder was present.    Assessment and Plan:     1. Well woman exam     2. Cervical cancer screening  THINPREP PAP WITH HPV   3. Screening for HPV (human papillomavirus)  THINPREP PAP WITH HPV   4. Screening for HIV (human immunodeficiency virus)  HIV AG/AB COMBO ASSAY SCREENING   5. Need for hepatitis C screening test  HEP C VIRUS ANTIBODY   6. Obesity (BMI 30-39.9)  Patient identified as having weight management issue.  Appropriate orders and counseling given.       HCM:   Up to date   Labs per orders  Immunizations per orders  Patient counseled about skin care, diet, supplements, prenatal vitamins, safe sex and exercise.    Follow-up: Return in about 1 year (around 4/10/2020) for Annual/wellness visit.

## 2019-04-11 DIAGNOSIS — Z11.51 SCREENING FOR HPV (HUMAN PAPILLOMAVIRUS): ICD-10-CM

## 2019-04-11 DIAGNOSIS — Z12.4 CERVICAL CANCER SCREENING: ICD-10-CM

## 2019-04-11 LAB
CYTOLOGY REG CYTOL: NORMAL
HPV HR 12 DNA CVX QL NAA+PROBE: NEGATIVE
HPV16 DNA SPEC QL NAA+PROBE: NEGATIVE
HPV18 DNA SPEC QL NAA+PROBE: NEGATIVE
SPECIMEN SOURCE: NORMAL

## 2019-04-13 ENCOUNTER — HOSPITAL ENCOUNTER (OUTPATIENT)
Dept: LAB | Facility: MEDICAL CENTER | Age: 54
End: 2019-04-13
Attending: FAMILY MEDICINE
Payer: COMMERCIAL

## 2019-04-13 DIAGNOSIS — Z11.4 SCREENING FOR HIV (HUMAN IMMUNODEFICIENCY VIRUS): ICD-10-CM

## 2019-04-13 DIAGNOSIS — Z11.59 NEED FOR HEPATITIS C SCREENING TEST: ICD-10-CM

## 2019-04-13 LAB
HCV AB SER QL: NEGATIVE
HIV 1+2 AB+HIV1 P24 AG SERPL QL IA: NON REACTIVE

## 2019-04-13 PROCEDURE — 36415 COLL VENOUS BLD VENIPUNCTURE: CPT

## 2019-04-13 PROCEDURE — 86803 HEPATITIS C AB TEST: CPT

## 2019-04-13 PROCEDURE — 87389 HIV-1 AG W/HIV-1&-2 AB AG IA: CPT

## 2019-05-10 DIAGNOSIS — F33.41 RECURRENT MAJOR DEPRESSIVE DISORDER, IN PARTIAL REMISSION (HCC): ICD-10-CM

## 2019-05-10 RX ORDER — CITALOPRAM 40 MG/1
40 TABLET ORAL DAILY
Qty: 90 TAB | Refills: 3 | Status: SHIPPED | OUTPATIENT
Start: 2019-05-10 | End: 2019-07-05 | Stop reason: SDUPTHER

## 2019-05-10 RX ORDER — LOSARTAN POTASSIUM AND HYDROCHLOROTHIAZIDE 25; 100 MG/1; MG/1
TABLET ORAL
Qty: 90 TAB | Refills: 3 | Status: SHIPPED | OUTPATIENT
Start: 2019-05-10 | End: 2020-05-19 | Stop reason: RX

## 2019-05-10 RX ORDER — ESTRADIOL 0.5 MG/1
TABLET ORAL
Qty: 90 TAB | Refills: 3 | Status: SHIPPED | OUTPATIENT
Start: 2019-05-10 | End: 2021-01-19

## 2019-05-10 RX ORDER — BUPROPION HYDROCHLORIDE 300 MG/1
TABLET ORAL
Qty: 90 TAB | Refills: 3 | Status: SHIPPED | OUTPATIENT
Start: 2019-05-10 | End: 2020-05-15

## 2019-05-10 RX ORDER — AMLODIPINE BESYLATE 10 MG/1
TABLET ORAL
Qty: 90 TAB | Refills: 3 | Status: SHIPPED | OUTPATIENT
Start: 2019-05-10 | End: 2020-05-19

## 2019-06-10 ENCOUNTER — APPOINTMENT (OUTPATIENT)
Dept: MEDICAL GROUP | Facility: PHYSICIAN GROUP | Age: 54
End: 2019-06-10
Payer: COMMERCIAL

## 2019-06-14 ENCOUNTER — APPOINTMENT (OUTPATIENT)
Dept: MEDICAL GROUP | Facility: PHYSICIAN GROUP | Age: 54
End: 2019-06-14
Payer: COMMERCIAL

## 2019-07-05 DIAGNOSIS — F33.41 RECURRENT MAJOR DEPRESSIVE DISORDER, IN PARTIAL REMISSION (HCC): ICD-10-CM

## 2019-07-05 RX ORDER — CITALOPRAM 40 MG/1
40 TABLET ORAL DAILY
Qty: 90 TAB | Refills: 3 | Status: SHIPPED | OUTPATIENT
Start: 2019-07-05 | End: 2020-07-14

## 2019-07-05 NOTE — TELEPHONE ENCOUNTER
----- Message from Alexandra Masterson sent at 7/5/2019  9:32 AM PDT -----  Regarding: Prescription Question  Contact: [phone]  I need a refill of Citalopram 40mg prescribed by Dr. Browne.    Out of refills.    Possible to have your office start a new RX at [facility] on [address]?    Many thanks,  Alexandra Masterson

## 2019-08-01 ENCOUNTER — OFFICE VISIT (OUTPATIENT)
Dept: URGENT CARE | Facility: CLINIC | Age: 54
End: 2019-08-01
Payer: COMMERCIAL

## 2019-08-01 VITALS
TEMPERATURE: 98.9 F | DIASTOLIC BLOOD PRESSURE: 72 MMHG | HEART RATE: 80 BPM | HEIGHT: 66 IN | OXYGEN SATURATION: 95 % | SYSTOLIC BLOOD PRESSURE: 126 MMHG | WEIGHT: 222 LBS | BODY MASS INDEX: 35.68 KG/M2 | RESPIRATION RATE: 16 BRPM

## 2019-08-01 DIAGNOSIS — J01.00 ACUTE MAXILLARY SINUSITIS, RECURRENCE NOT SPECIFIED: ICD-10-CM

## 2019-08-01 PROCEDURE — 99214 OFFICE O/P EST MOD 30 MIN: CPT | Performed by: PHYSICIAN ASSISTANT

## 2019-08-01 RX ORDER — AMOXICILLIN AND CLAVULANATE POTASSIUM 875; 125 MG/1; MG/1
1 TABLET, FILM COATED ORAL 2 TIMES DAILY
Qty: 14 TAB | Refills: 0 | Status: SHIPPED | OUTPATIENT
Start: 2019-08-01 | End: 2019-08-08

## 2019-08-01 ASSESSMENT — ENCOUNTER SYMPTOMS
FEVER: 0
PHOTOPHOBIA: 0
EYE DISCHARGE: 0
DOUBLE VISION: 0
BLURRED VISION: 0
CHILLS: 0
SINUS PAIN: 1
EYE REDNESS: 0
COUGH: 0
EYE PAIN: 1
HEADACHES: 1

## 2019-08-01 NOTE — PROGRESS NOTES
"Subjective:   Alexandra Masterson is a 53 y.o. female who presents for Sinus Problem (x1 week, sinus headache, sinus pressure and congestion, pain behind eyes)  This is a new problem.  Patient presents urgent care with 1 week history of facial pain and pressure in the maxillary sinus region congestion and headache and pain behind her eyes.  Patient did attempt to rinse her sinuses out with saline last night but this caused severe pain so she discontinued the treatment.  She has been using Flonase and loratadine with minimal improvement in symptoms.  Patient denies any fever or chills.  Denies blurred vision.        Sinus Problem   Associated symptoms include congestion and headaches. Pertinent negatives include no chills or coughing.     Review of Systems   Constitutional: Negative for chills, fever and malaise/fatigue.   HENT: Positive for congestion and sinus pain.    Eyes: Positive for pain. Negative for blurred vision, double vision, photophobia, discharge and redness.   Respiratory: Negative for cough.    Neurological: Positive for headaches.   All other systems reviewed and are negative.    No Known Allergies     Objective:   /72   Pulse 80   Temp 37.2 °C (98.9 °F) (Temporal)   Resp 16   Ht 1.676 m (5' 6\")   Wt 100.7 kg (222 lb)   SpO2 95%   BMI 35.83 kg/m²   Physical Exam   Constitutional: She is oriented to person, place, and time. She appears well-developed and well-nourished. She does not appear ill. No distress.   HENT:   Head: Normocephalic and atraumatic.   Right Ear: Tympanic membrane, external ear and ear canal normal.   Left Ear: Tympanic membrane, external ear and ear canal normal.   Nose: Mucosal edema present. Right sinus exhibits maxillary sinus tenderness. Right sinus exhibits no frontal sinus tenderness. Left sinus exhibits maxillary sinus tenderness. Left sinus exhibits no frontal sinus tenderness.   Mouth/Throat: Uvula is midline, oropharynx is clear and moist and mucous " membranes are normal. No oropharyngeal exudate.   Eyes: Pupils are equal, round, and reactive to light. Conjunctivae and EOM are normal.   Neck: Normal range of motion. Neck supple.   Cardiovascular: Normal rate, regular rhythm and normal heart sounds. Exam reveals no gallop and no friction rub.   No murmur heard.  Pulmonary/Chest: Effort normal and breath sounds normal. No respiratory distress. She has no wheezes. She has no rales.   Abdominal: Soft. Bowel sounds are normal. She exhibits no distension and no mass. There is no tenderness. There is no rebound and no guarding.   Musculoskeletal: Normal range of motion. She exhibits no edema, tenderness or deformity.   Lymphadenopathy:        Head (right side): No submental, no submandibular and no tonsillar adenopathy present.        Head (left side): No submental, no submandibular and no tonsillar adenopathy present.     She has no cervical adenopathy.        Right: No supraclavicular adenopathy present.        Left: No supraclavicular adenopathy present.   Neurological: She is alert and oriented to person, place, and time. She has normal strength. No cranial nerve deficit or sensory deficit. Coordination normal.   Skin: Skin is warm and dry. No rash noted.   Psychiatric: She has a normal mood and affect. Judgment normal.   Vitals reviewed.          Assessment/Plan:   Assessment    1. Acute maxillary sinusitis, recurrence not specified  - amoxicillin-clavulanate (AUGMENTIN) 875-125 MG Tab; Take 1 Tab by mouth 2 times a day for 7 days.  Dispense: 14 Tab; Refill: 0    Patient will be treated with Augmentin as above.  Recommend continued nasal saline rinse if tolerated as well as Flonase nasal spray and loratadine.  May use ibuprofen as needed for pain not to exceed recommended daily dose.    Differential diagnosis, natural history, supportive care, and indications for immediate follow-up discussed.    If not improving in 3-5 days, F/U with PCP or return to  or sooner  if worsens  Red flag warning symptoms and strict ER/follow-up precautions given.     The patient demonstrated a good understanding and agreed with the treatment plan.  Please note that this note was created using voice recognition speech to text software. Every effort has been made to correct obvious errors.  However, I expect there are errors of grammar and possibly context that were not discovered prior to finalizing the note  SARIKA Macdonald PA-C

## 2019-08-14 ENCOUNTER — HOSPITAL ENCOUNTER (OUTPATIENT)
Dept: LAB | Facility: MEDICAL CENTER | Age: 54
End: 2019-08-14
Payer: COMMERCIAL

## 2019-08-14 LAB
BDY FAT % MEASURED: 44 %
BP DIAS: 63 MMHG
BP SYS: 106 MMHG
CHOLEST SERPL-MCNC: 214 MG/DL (ref 100–199)
DIABETES HTDIA: NO
EVENT NAME HTEVT: NORMAL
FASTING HTFAS: YES
GLUCOSE SERPL-MCNC: 110 MG/DL (ref 65–99)
HDLC SERPL-MCNC: 46 MG/DL
HYPERTENSION HTHYP: NO
LDLC SERPL CALC-MCNC: 121 MG/DL
SCREENING LOC CITY HTCIT: NORMAL
SCREENING LOC STATE HTSTA: NORMAL
SCREENING LOCATION HTLOC: NORMAL
SMOKING HTSMO: NO
SUBSCRIBER ID HTSID: NORMAL
TRIGL SERPL-MCNC: 233 MG/DL (ref 0–149)

## 2019-08-14 PROCEDURE — S5190 WELLNESS ASSESSMENT BY NONPH: HCPCS

## 2019-08-14 PROCEDURE — 80061 LIPID PANEL: CPT

## 2019-08-14 PROCEDURE — 36415 COLL VENOUS BLD VENIPUNCTURE: CPT

## 2019-08-14 PROCEDURE — 82947 ASSAY GLUCOSE BLOOD QUANT: CPT

## 2019-09-24 ENCOUNTER — IMMUNIZATION (OUTPATIENT)
Dept: OCCUPATIONAL MEDICINE | Facility: CLINIC | Age: 54
End: 2019-09-24

## 2019-09-24 DIAGNOSIS — Z23 NEED FOR VACCINATION: ICD-10-CM

## 2019-09-24 PROCEDURE — 90686 IIV4 VACC NO PRSV 0.5 ML IM: CPT | Performed by: NURSE PRACTITIONER

## 2019-10-14 ENCOUNTER — OFFICE VISIT (OUTPATIENT)
Dept: DERMATOLOGY | Facility: IMAGING CENTER | Age: 54
End: 2019-10-14
Payer: COMMERCIAL

## 2019-10-14 DIAGNOSIS — D48.5 NEOPLASM OF UNCERTAIN BEHAVIOR OF SKIN: ICD-10-CM

## 2019-10-14 DIAGNOSIS — L82.1 SEBORRHEIC KERATOSES: ICD-10-CM

## 2019-10-14 DIAGNOSIS — L29.9 PRURITUS: ICD-10-CM

## 2019-10-14 DIAGNOSIS — R20.8 SKIN PAIN: ICD-10-CM

## 2019-10-14 DIAGNOSIS — L91.8 INFLAMED ACROCHORDON: ICD-10-CM

## 2019-10-14 DIAGNOSIS — D22.9 MULTIPLE NEVI: ICD-10-CM

## 2019-10-14 DIAGNOSIS — Z85.828 HISTORY OF BASAL CELL CARCINOMA: ICD-10-CM

## 2019-10-14 PROCEDURE — 99212 OFFICE O/P EST SF 10 MIN: CPT | Mod: 25 | Performed by: DERMATOLOGY

## 2019-10-14 PROCEDURE — 11200 RMVL SKIN TAGS UP TO&INC 15: CPT | Performed by: DERMATOLOGY

## 2019-10-14 PROCEDURE — 11102 TANGNTL BX SKIN SINGLE LES: CPT | Performed by: DERMATOLOGY

## 2019-10-14 ASSESSMENT — ENCOUNTER SYMPTOMS: FEVER: 0

## 2019-10-14 NOTE — PROGRESS NOTES
Dermatology Return Patient Visit    Chief Complaint   Patient presents with   • Follow-Up     SAM        Subjective:     HPI:   Alexandra Masterson is a 53 y.o. female presenting for    Follow up SAM     HPI: lesion that was treated at last visit   Location: nose   Time present: 2 years  Painful lesion: No  Itching lesion: No  Enlarging lesion: No  Anything make it better or worse?no     HPI: rough skin lesion   Location: left thigh   Time present: few months   Painful lesion: No  Itching lesion: No  Enlarging lesion: No  Anything make it better or worse? No     HPI/location: skin tag in right axilla  Time present: years  Painful lesion: Yes  Itching lesion: Yes  Enlarging lesion: Yes  Anything make it better or worse? Caught on clothing    History of skin cancer: Yes, Details: BCC left shoulder  History of precancers/actinic keratoses: Yes, Details: cryotherapy on tip ofn ose  History of biopsies:Yes, Details: see above  History of blistering/severe sunburns:Yes, Details: as a child  Family history of skin cancer:Yes, Details: Melanoma-  Family history of atypical moles:No      Past Medical History:   Diagnosis Date   • Acquired hypothyroidism 2016   • Anesthesia     nausea   • Arthritis     spine   • Basal cell carcinoma 2016   • Depression 2016   • Essential hypertension 2016   • Family history of melanoma 2016   • GERD (gastroesophageal reflux disease)    • Hyperlipidemia 2016   • Hypertension    • Nasal drainage    • Other specified disorder of intestines     diarrhea and constipation   • Prediabetes 2016   • Scarlet fever    • Snoring    • Substance abuse (HCC)     alcohol; quit    • Thyroid disease    • Viral meningitis    • Vitamin D deficiency 2016       Current Outpatient Medications on File Prior to Visit   Medication Sig Dispense Refill   • citalopram (CELEXA) 40 MG Tab Take 1 Tab by mouth every day. 90 Tab 3   • progesterone (PROMETRIUM) 100 MG Cap  TAKE ONE CAPSULE BY MOUTH ONCE DAILY 90 Cap 3   • losartan-hydrochlorothiazide (HYZAAR) 100-25 MG per tablet TAKE 1 TABLET BY MOUTH ONCE DAILY 90 Tab 3   • amLODIPine (NORVASC) 10 MG Tab TAKE 1 TABLET BY MOUTH ONCE DAILY 90 Tab 3   • buPROPion (WELLBUTRIN XL) 300 MG XL tablet TAKE 1 TABLET BY MOUTH ONCE DAILY IN THE MORNING 90 Tab 3   • estradiol (ESTRACE) 0.5 MG tablet TAKE 1 TABLET BY MOUTH ONCE DAILY 90 Tab 3   • levothyroxine (SYNTHROID) 125 MCG Tab TAKE 1 TABLET BY MOUTH ONCE DAILY IN THE MORNING ON AN EMPTY STOMACH 90 Tab 3   • albuterol 108 (90 Base) MCG/ACT Aero Soln inhalation aerosol Inhale 2 Puffs by mouth every 6 hours as needed. 8.5 g 0   • fluticasone (FLONASE) 50 MCG/ACT nasal spray USE TWO SPRAY(S) IN EACH NOSTRIL ONCE DAILY 16 g 3   • Loratadine (CLARITIN) 10 MG CAPS Take  by mouth every day.     • Multiple Vitamins-Minerals (MULTIVITAMIN PO) Take  by mouth every day.       No current facility-administered medications on file prior to visit.        No Known Allergies    Family History   Problem Relation Age of Onset   • Dementia Mother    • Cancer Father         melanoma   • Cancer Brother         basal cell carcinomas   • Diabetes Maternal Grandfather    • Heart Disease Neg Hx    • Stroke Neg Hx        Social History     Socioeconomic History   • Marital status:      Spouse name: Not on file   • Number of children: Not on file   • Years of education: Not on file   • Highest education level: Not on file   Occupational History   • Not on file   Social Needs   • Financial resource strain: Not on file   • Food insecurity:     Worry: Not on file     Inability: Not on file   • Transportation needs:     Medical: Not on file     Non-medical: Not on file   Tobacco Use   • Smoking status: Former Smoker     Packs/day: 0.50     Years: 15.00     Pack years: 7.50     Types: Cigarettes     Last attempt to quit: 2015     Years since quittin.0   • Smokeless tobacco: Never Used   Substance and Sexual  Activity   • Alcohol use: No     Alcohol/week: 0.0 oz     Comment: Sober since 2011   • Drug use: No   • Sexual activity: Yes     Partners: Male     Birth control/protection: Surgical   Lifestyle   • Physical activity:     Days per week: Not on file     Minutes per session: Not on file   • Stress: Not on file   Relationships   • Social connections:     Talks on phone: Not on file     Gets together: Not on file     Attends Jewish service: Not on file     Active member of club or organization: Not on file     Attends meetings of clubs or organizations: Not on file     Relationship status: Not on file   • Intimate partner violence:     Fear of current or ex partner: Not on file     Emotionally abused: Not on file     Physically abused: Not on file     Forced sexual activity: Not on file   Other Topics Concern   • Not on file   Social History Narrative   • Not on file       Review of Systems   Constitutional: Negative for fever.   Skin: Negative for itching and rash.   All other systems reviewed and are negative.       Objective:     A full mucocutaneous exam was completed including: scalp, hair, ears, face, eyelids, conjunctiva, lips, gums/tongue/oropharynx, neck, chest breasts, abdomen, back, left and right upper extremities (including hands/digits and fingernails), left and right lower extremities (including feet/toes, toenails), buttocks, excluding external genitalia (patient refusal) with the following pertinent findings listed below. Remaining above-listed examined areas within normal limits / negative for rashes or lesions.    There were no vitals taken for this visit.    Physical Exam   Constitutional: She is oriented to person, place, and time and well-developed, well-nourished, and in no distress.   HENT:   Head: Normocephalic and atraumatic.       Right Ear: External ear normal.   Left Ear: External ear normal.   Nose: Nose normal.   Mouth/Throat: Oropharynx is clear and moist.   Eyes: Conjunctivae and  lids are normal.   Neck: Normal range of motion. Neck supple.   Cardiovascular: Intact distal pulses.   Pulmonary/Chest: Effort normal.   Neurological: She is alert and oriented to person, place, and time.   Skin: Skin is warm and dry.        Psychiatric: Mood and affect normal.       DATA: none applicable to review    Assessment and Plan:     1. Neoplasm of uncertain behavior of skin  Procedure Note   Procedure: Biopsy by shave technique  Location: left nasal sidewall  Size: as noted in exam  Preoperative diagnosis:?telangiectasia r/o atypia  Risks, benefits and alternatives of procedure discussed, verbal consent obtained for photo (see chart) and written informed consent obtained for procedure. Time out completed. Area of biopsy prepped with alcohol. Anesthesia with 1% lidocaine with epinephrine administered with 30 gauge needle. Shave biopsy of the site performed. Hemostasis achieved with pressure and aluminum chloride. Vaseline applied to wound with bandage. Patient tolerated procedure well and there were no complications. The specimen was sent to the pathology lab by the staff. Wound care was discussed.  - Pathology Specimen; Future    2. Inflamed acrochordon, +pain/pruritus  SIMPLE REMOVAL NOTE:  Discussed risks and benefits of removal of lesion, including scar, discoloration, minimal risk of infection. Patient verbally agreed. Area cleaned with alcohol, iris scissors were use to remove the lesions in the right axilla. Patient tolerated procedure well, no complications. Aftercare discussed. Specimens to path  - Pathology Specimen; Future    3. Multiple nevi  - Benign-appearing nature of lesions discussed. Advised to return to clinic for any new or concerning changes.  - ABCDE's of melanoma discussed    4. Seborrheic keratoses  - Benign-appearing nature of lesions discussed. Advised to return to clinic for any new or concerning changes.    5. History of basal cell carcinoma  Skin cancer education  - discussed  importance of sun protective clothing, eyewear  - discussed importance of daily use of broad spectrum sunscreen with SPF 30 or greater, as well as need for reapplication ~every 2 hours when exposed to UVR  - discussed importance of regular self-exams, ideally once per month, every 6 months exams in clinic  - ABCDE's of melanoma discussed  - patient to bring any new or concerning lesions to my attention    Followup: Return in about 6 months (around 4/14/2020), or if symptoms worsen or fail to improve.    Robyn Tom M.D.

## 2019-10-21 ENCOUNTER — NON-PROVIDER VISIT (OUTPATIENT)
Dept: DERMATOLOGY | Facility: IMAGING CENTER | Age: 54
End: 2019-10-21
Payer: COMMERCIAL

## 2019-10-22 ENCOUNTER — TELEPHONE (OUTPATIENT)
Dept: DERMATOLOGY | Facility: IMAGING CENTER | Age: 54
End: 2019-10-22

## 2019-10-22 NOTE — PROGRESS NOTES
Alexandra Masterson is a 54 y.o. female here for a Non-Provider Visit for Suture Removal.    Sutures were placed by Dr. Tom on date: 10/14/19  Skin is healed: Yes  Provider notified if skin is not healed, or if there is redness, heat, pain, or drainage from incision: N\A  Sutures removed.   Mastisol and steristips are placed: No    Advised to use emollient (vaseline, aquaphor, etc.) as needed, avoid peroxide and antibiotic ointment to reduce irritation.     Path report has not been reviewed by provider.  Path report has not reviewed with patient.

## 2019-10-23 NOTE — TELEPHONE ENCOUNTER
Pt notified of results. She mentioned she thinks her punch bx site might not be healing right. I let her know signs of infection and to let us know if she has them. I also went over care for bx site. Pt understood. Path scanned in.

## 2020-01-27 ENCOUNTER — HOSPITAL ENCOUNTER (OUTPATIENT)
Dept: RADIOLOGY | Facility: MEDICAL CENTER | Age: 55
End: 2020-01-27
Attending: FAMILY MEDICINE
Payer: COMMERCIAL

## 2020-01-27 DIAGNOSIS — Z12.31 VISIT FOR SCREENING MAMMOGRAM: ICD-10-CM

## 2020-01-27 PROCEDURE — 77067 SCR MAMMO BI INCL CAD: CPT

## 2020-02-21 ENCOUNTER — OFFICE VISIT (OUTPATIENT)
Dept: MEDICAL GROUP | Facility: PHYSICIAN GROUP | Age: 55
End: 2020-02-21
Payer: COMMERCIAL

## 2020-02-21 VITALS
RESPIRATION RATE: 20 BRPM | DIASTOLIC BLOOD PRESSURE: 70 MMHG | TEMPERATURE: 99 F | BODY MASS INDEX: 38.02 KG/M2 | WEIGHT: 236.6 LBS | HEIGHT: 66 IN | SYSTOLIC BLOOD PRESSURE: 110 MMHG | HEART RATE: 78 BPM | OXYGEN SATURATION: 95 %

## 2020-02-21 DIAGNOSIS — J45.909 ASTHMA DUE TO ENVIRONMENTAL ALLERGIES: ICD-10-CM

## 2020-02-21 PROCEDURE — 99214 OFFICE O/P EST MOD 30 MIN: CPT | Performed by: FAMILY MEDICINE

## 2020-02-21 NOTE — PROGRESS NOTES
Chief Complaint   Patient presents with   • Pharyngitis     more than 2 weeks    • Head Ache     more than 2 weeks         HPI: Patient is a 54 y.o. female complaining of >14 days of illness including: chills, ear pain, fever, sore throat, headache, eye pain, sinus pressure, lethargy.   Mucus is: none.  Similarly ill exposures: yes.  Treatments tried: loratadine daily, flonase every other day, hot tea, trevon-seltzer cold tablets - not as helpful,   She  reports that she quit smoking about 4 years ago. Her smoking use included cigarettes. She has a 7.50 pack-year smoking history. She has never used smokeless tobacco..     ROS:  No cough, nausea, changes in bowel movements or skin rash.     I reviewed the patient's medications, allergies and medical history:  Current Outpatient Medications   Medication Sig Dispense Refill   • citalopram (CELEXA) 40 MG Tab Take 1 Tab by mouth every day. 90 Tab 3   • progesterone (PROMETRIUM) 100 MG Cap TAKE ONE CAPSULE BY MOUTH ONCE DAILY 90 Cap 3   • losartan-hydrochlorothiazide (HYZAAR) 100-25 MG per tablet TAKE 1 TABLET BY MOUTH ONCE DAILY 90 Tab 3   • amLODIPine (NORVASC) 10 MG Tab TAKE 1 TABLET BY MOUTH ONCE DAILY 90 Tab 3   • buPROPion (WELLBUTRIN XL) 300 MG XL tablet TAKE 1 TABLET BY MOUTH ONCE DAILY IN THE MORNING 90 Tab 3   • estradiol (ESTRACE) 0.5 MG tablet TAKE 1 TABLET BY MOUTH ONCE DAILY 90 Tab 3   • levothyroxine (SYNTHROID) 125 MCG Tab TAKE 1 TABLET BY MOUTH ONCE DAILY IN THE MORNING ON AN EMPTY STOMACH 90 Tab 3   • albuterol 108 (90 Base) MCG/ACT Aero Soln inhalation aerosol Inhale 2 Puffs by mouth every 6 hours as needed. 8.5 g 0   • fluticasone (FLONASE) 50 MCG/ACT nasal spray USE TWO SPRAY(S) IN EACH NOSTRIL ONCE DAILY 16 g 3   • Loratadine (CLARITIN) 10 MG CAPS Take  by mouth every day.     • Multiple Vitamins-Minerals (MULTIVITAMIN PO) Take  by mouth every day.       No current facility-administered medications for this visit.      Patient has no known  "allergies.  Past Medical History:   Diagnosis Date   • Acquired hypothyroidism 9/21/2016   • Anesthesia     nausea   • Arthritis     spine   • Basal cell carcinoma 9/21/2016   • Depression 9/21/2016   • Essential hypertension 9/21/2016   • Family history of melanoma 9/21/2016   • GERD (gastroesophageal reflux disease)    • Hyperlipidemia 9/21/2016   • Hypertension    • Nasal drainage    • Other specified disorder of intestines     diarrhea and constipation   • Prediabetes 9/21/2016   • Scarlet fever    • Snoring    • Substance abuse (HCC)     alcohol; quit 2011   • Thyroid disease    • Viral meningitis    • Vitamin D deficiency 9/21/2016        EXAM:  /70 (BP Location: Left arm, Patient Position: Sitting, BP Cuff Size: Adult)   Pulse 78   Temp 37.2 °C (99 °F) (Temporal)   Resp 20   Ht 1.676 m (5' 6\")   Wt 107.3 kg (236 lb 9.6 oz)   SpO2 95%   General: Alert, no conversational dyspnea or audible wheeze, non-toxic appearance.  Eyes: PERRL, conjunctiva normal, no eye discharge.  Ears: Normal pinnae,TM's normal bilaterally.  Nares: Patent with thin mucus.  Sinuses: tender over right maxillary sinus.  Throat: Erythematous injection without exudate.   Neck: Supple, with no adenopathy.  Lungs: Clear to auscultation bilaterally, no wheeze, crackles or rhonchi.   Heart: Regular rate without murmur.  Skin: Warm and dry without rash.     ASSESSMENT:   1. Asthma due to environmental allergies  This is a chronic condition, acute exacerbation.  She is over the last 2 weeks she has been struggling with symptoms of itchy/watery eyes, sore throat, sinus pressure, and headaches.  Her exam does not indicate an acute upper respiratory infection.  She states she has a longstanding history of allergies and gets year-round allergies and is been taking Claritin every day and Flonase every other day.  I wonder if Claritin is not working as well as it used to and if she is is not using Flonase as often as she should.  -Increase " Flonase to 2 puffs every day  -Change Claritin to Zyrtec  -We did discuss adding sinus rinses to see if would help with the sinus pressure and allergies    Return if symptoms worsen or fail to improve.

## 2020-02-21 NOTE — PATIENT INSTRUCTIONS
For the allergies:  - increase the flonase to every day  - try zytrec instead of claritin  - you could consider sinus rinses (Nick Med Sinus Rinse)

## 2020-03-02 ENCOUNTER — OFFICE VISIT (OUTPATIENT)
Dept: MEDICAL GROUP | Facility: PHYSICIAN GROUP | Age: 55
End: 2020-03-02
Payer: COMMERCIAL

## 2020-03-02 VITALS
SYSTOLIC BLOOD PRESSURE: 104 MMHG | DIASTOLIC BLOOD PRESSURE: 72 MMHG | HEIGHT: 66 IN | WEIGHT: 234.4 LBS | RESPIRATION RATE: 16 BRPM | TEMPERATURE: 99 F | HEART RATE: 74 BPM | OXYGEN SATURATION: 93 % | BODY MASS INDEX: 37.67 KG/M2

## 2020-03-02 DIAGNOSIS — Z23 NEED FOR VACCINATION: ICD-10-CM

## 2020-03-02 DIAGNOSIS — J06.9 ACUTE URI: ICD-10-CM

## 2020-03-02 PROCEDURE — 90471 IMMUNIZATION ADMIN: CPT | Performed by: FAMILY MEDICINE

## 2020-03-02 PROCEDURE — 99213 OFFICE O/P EST LOW 20 MIN: CPT | Mod: 25 | Performed by: FAMILY MEDICINE

## 2020-03-02 PROCEDURE — 90732 PPSV23 VACC 2 YRS+ SUBQ/IM: CPT | Performed by: FAMILY MEDICINE

## 2020-03-02 ASSESSMENT — PATIENT HEALTH QUESTIONNAIRE - PHQ9
1. LITTLE INTEREST OR PLEASURE IN DOING THINGS: NOT AT ALL
SUM OF ALL RESPONSES TO PHQ9 QUESTIONS 1 AND 2: 0
2. FEELING DOWN, DEPRESSED, IRRITABLE, OR HOPELESS: NOT AT ALL

## 2020-03-02 NOTE — PATIENT INSTRUCTIONS
My recommendations for an upper respiratory infection:  - Use a humidifier, especially at night. Cold or warm water humidifiers have the same effect.  - Nick Med squeeze bottle sinus rinses twice a day.  - Hot tea + honey + fresh lemon juice  - Throat Coat herbal tea  - Honey by itself has been shown to help fight bugs and provide cough relief  - Chicken noodle soup has also been shown to help fight bugs  - Coricidin HBP   - Marcial

## 2020-03-02 NOTE — LETTER
[address]  [facility]  [address]  [address]  [place] [id number]     March 2, 2020    Patient: Alexandra Masterson   YOB: 1965   Date of Visit: 3/2/2020       To Whom It May Concern:    Alexandra Masterson was seen and treated in our department on 3/2/2020. Please excuse her from work today (3/2/20) and tomorrow (3/3/20).    Sincerely,     Cherie Wesley M.D.

## 2020-03-02 NOTE — PROGRESS NOTES
Chief Complaint   Patient presents with   • Cough     4 days dry cough   • Pharyngitis     2 days    • Laryngitis     2 days         HPI: Patient is a 54 y.o. female complaining of 4 days of illness including: dry cough, ear pain, sore throat, wheezing, voice hoarseness, chest burning..   Mucus is: unknown.  Similarly ill exposures: yes. (but no one suspected of COVID-19)  Treatments tried: zyrtec, flonase 2 puffs daily,   Travel history: none in last 14 days  She  reports that she quit smoking about 4 years ago. Her smoking use included cigarettes. She has a 7.50 pack-year smoking history. She has never used smokeless tobacco..     ROS:  No fever, nausea, changes in bowel movements or skin rash.      I reviewed the patient's medications, allergies and medical history:  Current Outpatient Medications   Medication Sig Dispense Refill   • citalopram (CELEXA) 40 MG Tab Take 1 Tab by mouth every day. 90 Tab 3   • progesterone (PROMETRIUM) 100 MG Cap TAKE ONE CAPSULE BY MOUTH ONCE DAILY 90 Cap 3   • losartan-hydrochlorothiazide (HYZAAR) 100-25 MG per tablet TAKE 1 TABLET BY MOUTH ONCE DAILY 90 Tab 3   • amLODIPine (NORVASC) 10 MG Tab TAKE 1 TABLET BY MOUTH ONCE DAILY 90 Tab 3   • buPROPion (WELLBUTRIN XL) 300 MG XL tablet TAKE 1 TABLET BY MOUTH ONCE DAILY IN THE MORNING 90 Tab 3   • estradiol (ESTRACE) 0.5 MG tablet TAKE 1 TABLET BY MOUTH ONCE DAILY 90 Tab 3   • levothyroxine (SYNTHROID) 125 MCG Tab TAKE 1 TABLET BY MOUTH ONCE DAILY IN THE MORNING ON AN EMPTY STOMACH 90 Tab 3   • albuterol 108 (90 Base) MCG/ACT Aero Soln inhalation aerosol Inhale 2 Puffs by mouth every 6 hours as needed. 8.5 g 0   • fluticasone (FLONASE) 50 MCG/ACT nasal spray USE TWO SPRAY(S) IN EACH NOSTRIL ONCE DAILY 16 g 3   • Loratadine (CLARITIN) 10 MG CAPS Take  by mouth every day.     • Multiple Vitamins-Minerals (MULTIVITAMIN PO) Take  by mouth every day.       No current facility-administered medications for this visit.      Patient has no  "known allergies.  Past Medical History:   Diagnosis Date   • Acquired hypothyroidism 9/21/2016   • Anesthesia     nausea   • Arthritis     spine   • Basal cell carcinoma 9/21/2016   • Depression 9/21/2016   • Essential hypertension 9/21/2016   • Family history of melanoma 9/21/2016   • GERD (gastroesophageal reflux disease)    • Hyperlipidemia 9/21/2016   • Hypertension    • Nasal drainage    • Other specified disorder of intestines     diarrhea and constipation   • Prediabetes 9/21/2016   • Scarlet fever    • Snoring    • Substance abuse (HCC)     alcohol; quit 2011   • Thyroid disease    • Viral meningitis    • Vitamin D deficiency 9/21/2016        EXAM:  /72 (BP Location: Left arm, Patient Position: Sitting, BP Cuff Size: Adult)   Pulse 74   Temp 37.2 °C (99 °F) (Temporal)   Resp 16   Ht 1.676 m (5' 6\")   Wt 106.3 kg (234 lb 6.4 oz)   SpO2 93%   General: Alert, no conversational dyspnea or audible wheeze, non-toxic appearance.  Eyes: PERRL, conjunctiva slightly injected, no eye discharge.  Ears: Normal pinnae,TM's normal bilaterally.  Nares: Patent with thin mucus.  Sinuses: non tender over maxillary / frontal sinuses.  Throat: Erythematous injection without exudate.   Neck: Supple, with no adenopathy.  Lungs: Clear to auscultation bilaterally, no wheeze, crackles or rhonchi.   Heart: Regular rate without murmur.  Skin: Warm and dry without rash.     ASSESSMENT:   1. Acute URI    2. Need for vaccination          PLAN:  1. Educated patient that majority of upper respiratory infections are viral and do not need antibiotics.   2. Twice daily use of nasal saline rinse or Neti-Pot.  3. OTC anti-pyretics and decongestants as needed.  4. Follow-up in office or urgent care for worsening symptoms, difficulty breathing, lack of expected recovery, or should new symptoms or problems arise.  "

## 2020-04-15 ENCOUNTER — APPOINTMENT (OUTPATIENT)
Dept: MEDICAL GROUP | Facility: PHYSICIAN GROUP | Age: 55
End: 2020-04-15
Payer: COMMERCIAL

## 2020-04-22 ENCOUNTER — TELEMEDICINE (OUTPATIENT)
Dept: SLEEP MEDICINE | Facility: MEDICAL CENTER | Age: 55
End: 2020-04-22
Payer: COMMERCIAL

## 2020-04-22 VITALS — WEIGHT: 232.8 LBS | HEIGHT: 66 IN | BODY MASS INDEX: 37.41 KG/M2

## 2020-04-22 DIAGNOSIS — G47.33 OSA (OBSTRUCTIVE SLEEP APNEA): ICD-10-CM

## 2020-04-22 PROCEDURE — 99442 PR PHYSICIAN TELEPHONE EVALUATION 11-20 MIN: CPT | Mod: CR | Performed by: INTERNAL MEDICINE

## 2020-04-22 NOTE — PROGRESS NOTES
Telephone Appointment Visit   As a means of avoiding spread of COVID-19, this visit is being conducted by telephone. This telephone visit was initiated by the patient and they verbally consented.    Time at start of call: 11:00    Reason for Call:  EMRE follow up    Patient Comments / History:   The patient presented for annual follow-up of EMRE.  Her last visit however was in December 2017 she has history of moderate EMRE, on AutoPap:4-20 cm H20.  She states she uses CPAP nightly, in fact cannot sleep without it.  Unfortunately her CPAP compliance chip is corrupted and no data could be downloaded.  She requires new CPAP supplies.  She otherwise sleeps well on CPAP and has no specific issues with treatment.  Weight has been stable.    Labs / Images Reviewed   Compliance data    Assessment and Plan:     1. EMRE (obstructive sleep apnea)  - DME Mask and Supplies    The patient's CPAP compliance chip is corrupted.  We will arrange for a replacement compliance chip and new CPAP supplies.  She requires download of her CPAP compliance data subsequently.    Continue AutoPap: 4-20 cm of water in the interim.  Follow-up: Return in about 4 months (around 8/22/2020).    Time at end of call: 11:15  Total Time Spent: 15 min    Olivia Batista M.D.

## 2020-04-22 NOTE — NON-PROVIDER
Previous [facility], Switching pt to [facility].     Pt has been provided with contact information for new DME .     Unable to pull current compliance.

## 2020-05-15 DIAGNOSIS — I10 ESSENTIAL HYPERTENSION: ICD-10-CM

## 2020-05-15 DIAGNOSIS — E89.0 POSTOPERATIVE HYPOTHYROIDISM: ICD-10-CM

## 2020-05-19 RX ORDER — LOSARTAN POTASSIUM 100 MG/1
TABLET ORAL
Qty: 90 TAB | Refills: 3 | Status: SHIPPED | OUTPATIENT
Start: 2020-05-19 | End: 2021-01-19 | Stop reason: SDUPTHER

## 2020-05-19 RX ORDER — AMLODIPINE BESYLATE 10 MG/1
TABLET ORAL
Qty: 90 TAB | Refills: 3 | Status: SHIPPED | OUTPATIENT
Start: 2020-05-19 | End: 2021-01-19 | Stop reason: SDUPTHER

## 2020-05-19 NOTE — TELEPHONE ENCOUNTER
Phone Number Called: [phone] (home)     Call outcome: Did not leave a detailed message. Requested patient to call back.    Message: LVM to call back.

## 2020-05-22 ENCOUNTER — TELEMEDICINE (OUTPATIENT)
Dept: MEDICAL GROUP | Facility: PHYSICIAN GROUP | Age: 55
End: 2020-05-22
Payer: COMMERCIAL

## 2020-05-22 VITALS — HEIGHT: 66 IN | WEIGHT: 230 LBS | BODY MASS INDEX: 36.96 KG/M2 | HEART RATE: 64 BPM

## 2020-05-22 DIAGNOSIS — F33.41 RECURRENT MAJOR DEPRESSIVE DISORDER, IN PARTIAL REMISSION (HCC): ICD-10-CM

## 2020-05-22 DIAGNOSIS — Z78.0 MENOPAUSE: ICD-10-CM

## 2020-05-22 DIAGNOSIS — I10 ESSENTIAL HYPERTENSION: Primary | ICD-10-CM

## 2020-05-22 PROCEDURE — 99214 OFFICE O/P EST MOD 30 MIN: CPT | Mod: 95,CR | Performed by: FAMILY MEDICINE

## 2020-05-22 RX ORDER — BUPROPION HYDROCHLORIDE 150 MG/1
150 TABLET ORAL EVERY MORNING
Qty: 30 TAB | Refills: 0 | Status: SHIPPED | OUTPATIENT
Start: 2020-05-22 | End: 2020-09-09

## 2020-05-22 NOTE — ASSESSMENT & PLAN NOTE
This is a chronic condition.  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

## 2020-05-22 NOTE — ASSESSMENT & PLAN NOTE
This is a chronic condition.  Onset: many years  Current Meds: amlodipine 10 mg daily, losartan 100 mg daily  Side effects: none  Home BP Log: none  Associated symptoms: no cp, no sob    She wonders if she still needs to be on both of these medications.

## 2020-05-22 NOTE — ASSESSMENT & PLAN NOTE
This is a chronic condition.  She has been on estrogen progesterone since 2014 to help with hot flashes.  Her last menstrual period was approximately 2017 or earlier.  She states is working very well and she wonders if she still needs the medications.

## 2020-05-22 NOTE — PROGRESS NOTES
Telemedicine Visit: Established Patient     This encounter was conducted via Zoom .   Verbal consent was obtained. Patient's identity was verified.    Subjective:   CC: medication review  Alexandra Masterson is a 54 y.o. female presenting for evaluation and management of:    She made this appointment today to discuss all of her medications.  She is curious as to what all the medications are for and if she truly needs to be on all of them.    Essential hypertension  This is a chronic condition.  Onset: many years  Current Meds: amlodipine 10 mg daily, losartan 100 mg daily  Side effects: none  Home BP Log: none  Associated symptoms: no cp, no sob    She wonders if she still needs to be on both of these medications.      Recurrent major depressive disorder, in partial remission (HCC)  This is a chronic condition.  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    Menopause  This is a chronic condition.  She has been on estrogen progesterone since 2014 to help with hot flashes.  Her last menstrual period was approximately 2017 or earlier.  She states is working very well and she wonders if she still needs the medications.    ROS   Denies any recent fevers or chills. No nausea or vomiting. No chest pains or shortness of breath.     No Known Allergies    Current medicines (including changes today)  Current Outpatient Medications   Medication Sig Dispense Refill   • buPROPion (WELLBUTRIN XL) 150 MG XL tablet Take 1 Tab by mouth every morning. 30 Tab 0   • progesterone (PROMETRIUM) 100 MG Cap Take 1 capsule by mouth once daily 90 Cap 3   • amLODIPine (NORVASC) 10 MG Tab Take 1 tablet by mouth once daily 90 Tab 3   • losartan (COZAAR)  100 MG Tab TAKE 1 TABLET BY MOUTH ONCE DAILY WITH  HCTZ  25MG 90 Tab 3   • EUTHYROX 125 MCG Tab TAKE 1 TABLET BY MOUTH ONCE DAILY IN THE MORNING ON AN EMPTY STOMACH 90 Tab 0   • citalopram (CELEXA) 40 MG Tab Take 1 Tab by mouth every day. 90 Tab 3   • estradiol (ESTRACE) 0.5 MG tablet TAKE 1 TABLET BY MOUTH ONCE DAILY 90 Tab 3   • albuterol 108 (90 Base) MCG/ACT Aero Soln inhalation aerosol Inhale 2 Puffs by mouth every 6 hours as needed. 8.5 g 0   • fluticasone (FLONASE) 50 MCG/ACT nasal spray USE TWO SPRAY(S) IN EACH NOSTRIL ONCE DAILY 16 g 3   • Loratadine (CLARITIN) 10 MG CAPS Take  by mouth every day.     • Multiple Vitamins-Minerals (MULTIVITAMIN PO) Take  by mouth every day.       No current facility-administered medications for this visit.        Patient Active Problem List    Diagnosis Date Noted   • Alcohol abuse, in remission 04/10/2019   • Itching 03/13/2019   • Menopause 03/13/2019   • EMRE (obstructive sleep apnea) 06/27/2017   • Essential hypertension 09/21/2016   • Postoperative hypothyroidism 09/21/2016   • Recurrent major depressive disorder, in partial remission (HCC) 09/21/2016   • Vitamin D deficiency 09/21/2016   • Hyperlipidemia 09/21/2016   • Prediabetes 09/21/2016   • Obesity (BMI 30-39.9) 09/21/2016   • Basal cell carcinoma 09/21/2016   • Family history of melanoma 09/21/2016       Family History   Problem Relation Age of Onset   • Dementia Mother    • Cancer Father         melanoma   • Cancer Brother         basal cell carcinomas   • Diabetes Maternal Grandfather    • Heart Disease Neg Hx    • Stroke Neg Hx        She  has a past medical history of Acquired hypothyroidism (9/21/2016), Anesthesia, Arthritis, Basal cell carcinoma (9/21/2016), Depression (9/21/2016), Essential hypertension (9/21/2016), Family history of melanoma (9/21/2016), GERD (gastroesophageal reflux disease), Hyperlipidemia (9/21/2016), Hypertension, Nasal drainage, Other specified disorder of intestines, Prediabetes  "(9/21/2016), Scarlet fever, Snoring, Substance abuse (HCC), Thyroid disease, Viral meningitis, and Vitamin D deficiency (9/21/2016).  She  has a past surgical history that includes bonny by laparoscopy (1994); gyn surgery (2008); and thyroidectomy total (8/13/2013).       Objective:   Pulse 64   Ht 1.676 m (5' 6\") Comment: pt reported  Wt 104.3 kg (230 lb) Comment: pt reported  BMI 37.12 kg/m²     Physical Exam:  Constitutional: Alert, no distress, well-groomed.  Skin: No rashes in visible areas.  Eye: Round. Conjunctiva clear, lids normal. No icterus.   ENMT: Lips pink without lesions, good dentition, moist mucous membranes. Phonation normal.  Neck: No masses, no thyromegaly. Moves freely without pain.  CV: Pulse as reported by patient  Respiratory: Unlabored respiratory effort, no cough or audible wheeze  Psych: Alert and oriented x3, normal affect and mood.       Assessment and Plan:   The following treatment plan was discussed:     1. Essential hypertension  This is a chronic condition, well-controlled.  We do not have a blood pressure for today's visit but her blood pressure in March, 2020 was 100/70s.  She wonders if she needs both medications to cold troll her blood pressure so we decided to dial back on 1 of her medications.  -Continue losartan 100 mg daily  -Decrease amlodipine to 5 mg daily  -Home blood pressure log    2. Recurrent major depressive disorder, in partial remission (HCC)  This is a chronic condition, controlled.  She has had depression since approximately 2007.  She is on 2 medications to control the depression and she wonders if she requires both.  She reports her mood is very good and she denies any depression.  She denies any suicidal or homicidal ideation.  She has been on these medications for years and wonders if she can come off.  Therefore, after discussion we are going to decrease 1 of the medication doses today.  -Continue citalopram 40 mg daily  -Decrease bupropion XL to 150 mg " daily    3. Menopause  This is a chronic condition, stable.  She has been on estrogen and progesterone to control hot flashes since 2014.  She states her last menstrual period was in 2017 or earlier.  She denies any hot flash symptoms and wonders if she still requires medication so we will start weaning her off the estrogen.  -Decrease estrogen to 0.25 mg daily  -Continue progesterone until off estrogen    Follow-up: Return in about 2 weeks (around 6/5/2020) for f/u med changes.

## 2020-05-28 ENCOUNTER — OFFICE VISIT (OUTPATIENT)
Dept: MEDICAL GROUP | Facility: PHYSICIAN GROUP | Age: 55
End: 2020-05-28
Payer: COMMERCIAL

## 2020-05-28 VITALS
HEART RATE: 75 BPM | OXYGEN SATURATION: 98 % | WEIGHT: 239 LBS | TEMPERATURE: 99 F | DIASTOLIC BLOOD PRESSURE: 74 MMHG | SYSTOLIC BLOOD PRESSURE: 110 MMHG | HEIGHT: 66 IN | RESPIRATION RATE: 18 BRPM | BODY MASS INDEX: 38.41 KG/M2

## 2020-05-28 DIAGNOSIS — R42 LIGHTHEADEDNESS: ICD-10-CM

## 2020-05-28 DIAGNOSIS — R07.9 CHEST PAIN, UNSPECIFIED TYPE: Primary | ICD-10-CM

## 2020-05-28 PROCEDURE — 99214 OFFICE O/P EST MOD 30 MIN: CPT | Performed by: FAMILY MEDICINE

## 2020-05-28 NOTE — PROGRESS NOTES
Subjective:     CC: chest pain, lightheadedness    HPI:   Alexandra presents today with an acute episode.  She states last night she had 2 separate episodes within a 45-minute period with the following symptoms: Lightheadedness, left arm numbness, and a twinge in the back of her neck on the left side.  Each episode lasted less than a minute.  We had been decreasing her amlodipine and she noticed her blood pressures during episodes were 130/70 so she went back to a full dose of amlodipine.  Blood pressures morning is 100s/60s with no repeat of the episode.  However, when she woke up this morning she did note some a dull ache and soreness of the left side of the chest.  No associate shortness of breath or diaphoresis.  She does note that she moves her body or twists her torso she can feel that pain in her chest.  These episodes have occurred about once a month over the last few months.    Past Medical History:   Diagnosis Date   • Acquired hypothyroidism 2016   • Anesthesia     nausea   • Arthritis     spine   • Basal cell carcinoma 2016   • Depression 2016   • Essential hypertension 2016   • Family history of melanoma 2016   • GERD (gastroesophageal reflux disease)    • Hyperlipidemia 2016   • Hypertension    • Nasal drainage    • Other specified disorder of intestines     diarrhea and constipation   • Prediabetes 2016   • Scarlet fever    • Snoring    • Substance abuse (HCC)     alcohol; quit    • Thyroid disease    • Viral meningitis    • Vitamin D deficiency 2016       Social History     Tobacco Use   • Smoking status: Former Smoker     Packs/day: 0.50     Years: 15.00     Pack years: 7.50     Types: Cigarettes     Last attempt to quit: 2015     Years since quittin.6   • Smokeless tobacco: Never Used   Substance Use Topics   • Alcohol use: No     Alcohol/week: 0.0 oz     Comment: Sober since    • Drug use: No       Current Outpatient Medications Ordered in  "Epic   Medication Sig Dispense Refill   • buPROPion (WELLBUTRIN XL) 150 MG XL tablet Take 1 Tab by mouth every morning. 30 Tab 0   • progesterone (PROMETRIUM) 100 MG Cap Take 1 capsule by mouth once daily 90 Cap 3   • amLODIPine (NORVASC) 10 MG Tab Take 1 tablet by mouth once daily 90 Tab 3   • losartan (COZAAR) 100 MG Tab TAKE 1 TABLET BY MOUTH ONCE DAILY WITH  HCTZ  25MG 90 Tab 3   • EUTHYROX 125 MCG Tab TAKE 1 TABLET BY MOUTH ONCE DAILY IN THE MORNING ON AN EMPTY STOMACH 90 Tab 0   • citalopram (CELEXA) 40 MG Tab Take 1 Tab by mouth every day. 90 Tab 3   • estradiol (ESTRACE) 0.5 MG tablet TAKE 1 TABLET BY MOUTH ONCE DAILY 90 Tab 3   • albuterol 108 (90 Base) MCG/ACT Aero Soln inhalation aerosol Inhale 2 Puffs by mouth every 6 hours as needed. 8.5 g 0   • fluticasone (FLONASE) 50 MCG/ACT nasal spray USE TWO SPRAY(S) IN EACH NOSTRIL ONCE DAILY 16 g 3   • Loratadine (CLARITIN) 10 MG CAPS Take  by mouth every day.     • Multiple Vitamins-Minerals (MULTIVITAMIN PO) Take  by mouth every day.       No current [facility]-ordered facility-administered medications on file.        Allergies:  Patient has no known allergies.    Health Maintenance: Completed    ROS:  Gen: no fevers/chills  Pulm: no sob  CV: no chest pain - see hpi    Objective:       Exam:  /74   Pulse 75   Temp 37.2 °C (99 °F)   Resp 18   Ht 1.676 m (5' 6\")   Wt 108.4 kg (239 lb)   SpO2 98%   BMI 38.58 kg/m²  Body mass index is 38.58 kg/m².    Gen: Alert and oriented, No apparent distress.  Neck: Neck is supple without lymphadenopathy.  Chest: +TTP over left chest.  Lungs: Normal effort, CTA bilaterally, no wheezes, rhonchi, or rales  CV: Regular rate and rhythm. No murmurs, rubs, or gallops.  Ext: No clubbing, cyanosis, edema.    EKG Interpretation   Ordered and interpreted by Cherie Wesley MD  Rhythm: normal sinus   Rate: normal   Axis: normal   Ectopy: none   Conduction: normal   ST Segments: no acute change   T Waves: no acute change   Q Waves: " none   Clinical Impression: no acute changes and normal EKG      Assessment & Plan:     54 y.o. female with the following -     1. Chest pain, unspecified type  2. Lightheadedness  This is a new episode.  Last night she had 2 episodes of lightheadedness with left arm numbness and a twinge in the back of her neck.  Each episode lasted less than a minute.  She usually thought might be due to her blood pressure medication though when she increase the dose again (we had been decreasing it) she did get a repeat episode despite having blood pressures in the 100s/60s.  I do not think it is related to her blood pressure.  This morning she did have some left-sided chest pain that is a soreness with a dull ache.  EKG in office was normal.  We had decreased her bupropion to see if she still needs to be on bupropion and an SSRI so this could be worsening anxiety due to that.  This could be a panic attack.  However, with her medical history and with the chest pain getting a stress test would be appropriate.  - EKG - Clinic Performed  - no change to medications at this time. She can continue half dose amlodipine  - stress echocardiogram    Return if symptoms worsen or fail to improve.    Please note that this dictation was created using voice recognition software. I have made every reasonable attempt to correct obvious errors, but I expect that there are errors of grammar and possibly content that I did not discover before finalizing the note.

## 2020-06-08 ENCOUNTER — APPOINTMENT (OUTPATIENT)
Dept: MEDICAL GROUP | Facility: PHYSICIAN GROUP | Age: 55
End: 2020-06-08
Payer: COMMERCIAL

## 2020-06-09 ENCOUNTER — HOSPITAL ENCOUNTER (OUTPATIENT)
Dept: CARDIOLOGY | Facility: MEDICAL CENTER | Age: 55
End: 2020-06-09
Attending: FAMILY MEDICINE
Payer: COMMERCIAL

## 2020-06-09 DIAGNOSIS — R07.9 CHEST PAIN, UNSPECIFIED TYPE: ICD-10-CM

## 2020-06-09 PROCEDURE — 93017 CV STRESS TEST TRACING ONLY: CPT

## 2020-06-10 LAB — LV EJECT FRACT  99904: 75

## 2020-06-10 PROCEDURE — 93018 CV STRESS TEST I&R ONLY: CPT | Performed by: INTERNAL MEDICINE

## 2020-06-10 PROCEDURE — 93350 STRESS TTE ONLY: CPT | Mod: 26 | Performed by: INTERNAL MEDICINE

## 2020-06-11 ENCOUNTER — APPOINTMENT (OUTPATIENT)
Dept: RADIOLOGY | Facility: IMAGING CENTER | Age: 55
End: 2020-06-11
Attending: NURSE PRACTITIONER
Payer: COMMERCIAL

## 2020-06-11 ENCOUNTER — OFFICE VISIT (OUTPATIENT)
Dept: URGENT CARE | Facility: CLINIC | Age: 55
End: 2020-06-11
Payer: COMMERCIAL

## 2020-06-11 VITALS
OXYGEN SATURATION: 93 % | HEART RATE: 72 BPM | TEMPERATURE: 98.5 F | DIASTOLIC BLOOD PRESSURE: 82 MMHG | WEIGHT: 239 LBS | HEIGHT: 66 IN | BODY MASS INDEX: 38.41 KG/M2 | SYSTOLIC BLOOD PRESSURE: 134 MMHG

## 2020-06-11 DIAGNOSIS — S49.91XA SHOULDER INJURY, RIGHT, INITIAL ENCOUNTER: ICD-10-CM

## 2020-06-11 DIAGNOSIS — R22.32 LOCALIZED SWELLING ON LEFT HAND: ICD-10-CM

## 2020-06-11 PROCEDURE — 99213 OFFICE O/P EST LOW 20 MIN: CPT | Performed by: NURSE PRACTITIONER

## 2020-06-11 PROCEDURE — 73030 X-RAY EXAM OF SHOULDER: CPT | Mod: TC,RT | Performed by: NURSE PRACTITIONER

## 2020-06-11 PROCEDURE — 73130 X-RAY EXAM OF HAND: CPT | Mod: TC,LT | Performed by: NURSE PRACTITIONER

## 2020-06-11 ASSESSMENT — ENCOUNTER SYMPTOMS
SENSORY CHANGE: 0
TINGLING: 0
FEVER: 0
MYALGIAS: 1
CHILLS: 0
FOCAL WEAKNESS: 0
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
## 2020-06-22 ENCOUNTER — NON-PROVIDER VISIT (OUTPATIENT)
Dept: HEALTH INFORMATION MANAGEMENT | Facility: MEDICAL CENTER | Age: 55
End: 2020-06-22
Payer: COMMERCIAL

## 2020-06-22 VITALS — WEIGHT: 242.5 LBS | BODY MASS INDEX: 38.97 KG/M2 | HEIGHT: 66 IN

## 2020-06-22 DIAGNOSIS — E66.9 OBESITY (BMI 30-39.9): ICD-10-CM

## 2020-06-22 PROCEDURE — 97802 MEDICAL NUTRITION INDIV IN: CPT | Performed by: DIETITIAN, REGISTERED

## 2020-06-23 NOTE — PROGRESS NOTES
"6/22/2020    Cherie Wesley M.D.  54 y.o.   Time in/out: 3:40-4:41pm    Anthropometrics/Objective  Vitals:    06/22/20 1713   Weight: 110 kg (242 lb 8 oz)   Height: 1.676 m (5' 6\")       Body mass index is 39.14 kg/m².    Stated Goal Weight: 180 lbs  Estimated Caloric needs <1707 Kcal (MSJ)   See comprehensive patient history form for further information     Subjective:  · Pt here seeking assistance with weight loss and improving nutrition-related lab values (BG, cholesterol, etc.)   · She works as a patient care coordinator   · Previously tried MWM program, but it wasn't for her  · Hx total thyroidectomy (2013) - taking levothyroxine   · Reports gaining 55 lbs over last 7 years   · Tries to walk on her breaks at work   · Takes spin class and yoga  · Currently exercises for ~1 hr, 3x/wk   · Not a huge fan of chicken - prefers beef/pork     Diet Recall:    B- 2 slices Robert bread + 1/2 avocado OR 1.5 c Honey Bunches Oats cereal w/unsweetened almond milk & berries   S- 1 cup berries + Chobani Greek yogurt  L- salad w/protein, 1.5 tbsp Italian dressing OR Ton Eliseo's \"unwhich\"  S- handful nuts   D- meat (usually beef) + NSV *typically little to no carb/starch OR cheese/crackers for dinner   S- sweets 2-3x/wk (2 cookies, doughnuts at work, ice cream, etc.)   Bevs - water, coffee + 1 tbsp half/half, sparkling zero jazmin water      Nutrition Diagnosis (PES Statement)    Obesity related to excess energy intake and inadequate energy expenditure as evidenced by BMI = 39.14 kg/m2.    Client history:  Condition(s) associated with a diagnosis or treatment (specify) prediabetes, essential hypertension, hypothyroidism (thyroidectomy), hyperlipidemia, EMRE, major depressive disorder, etc.     Biochemical data, medical test and procedures  Lab Results   Component Value Date/Time    HBA1C 5.8 (H) 12/18/2017 07:15 AM   @  No results found for: POCGLUCOSE  Lab Results   Component Value Date/Time    CHOLSTRLTOT 214 (H) 08/14/2019 " 10:31 AM     (H) 08/14/2019 10:31 AM    HDL 46 08/14/2019 10:31 AM    TRIGLYCERIDE 233 (H) 08/14/2019 10:31 AM         Nutrition Intervention    Nutrition Prescription  Recommended Daily Kcals: 1500    Meal and Snack  Recommend a general/healthful diet  Follow Plate Method at meals (1/2 plate non-starchy vegetables, 3-4 ounces lean protein, 1-2 heart healthy fats, and <1 cup of complex carbohydrates)    Comprehensive Nutrition education Instruction or training leading to in-depth nutrition related knowledge about:  Combine carb, protein and fat at each meal, Metabolism of carb, protein, fat, Physical activity/exercise, Portion control, Sweets and alcohol in moderation, Heart-healthy guidelines and Handouts provided regarding topics discussed    Handouts provided: Nutrition Basics, My Plate Planner, instructions for My Fitness Pal lexi     Monitoring & Evaluation Plan    Behavioral-Environmental:  Behavior: Track food/drinks in CrunchedP lexi.   Physical activity: Maintain current physical activity regimen - will discuss increasing PA further at future visits    Food / Nutrient Intake:  Follow Plate Method   Include lean protein at each meal/snack     Physical Signs / Symptoms:  HbA1c profiles trend WNL  Lipid profiles trend WNL   Weight loss of 1-2 lbs per week to goal     Assessment Notes: Per discussion with pt, she seems to already have some healthy habits in place. She will benefit from increasing her physical activity, tracking her calorie intake, and following the plate method at her meals to promote healthy, sustainable weight loss. She may also benefit on more education to improve blood sugar management and lipid labs as well. Next visit, we will review her MFP intake to assess areas for improvement.      Goals:   1. Track food/drinks in MFP lexi 4 days/wk - aim for goal of 1500 calories/day   2. Ensure physical activity feature off (not adding to calories in MFP)     Follow-Up: 2 weeks.

## 2020-06-30 ENCOUNTER — HOSPITAL ENCOUNTER (OUTPATIENT)
Facility: MEDICAL CENTER | Age: 55
End: 2020-06-30
Attending: PREVENTIVE MEDICINE
Payer: COMMERCIAL

## 2020-06-30 ENCOUNTER — NON-PROVIDER VISIT (OUTPATIENT)
Dept: OCCUPATIONAL MEDICINE | Facility: CLINIC | Age: 55
End: 2020-06-30

## 2020-06-30 DIAGNOSIS — Z11.59 ENCOUNTER FOR SCREENING FOR OTHER VIRAL DISEASES: ICD-10-CM

## 2020-06-30 LAB — COVID ORDER STATUS COVID19: NORMAL

## 2020-06-30 PROCEDURE — U0003 INFECTIOUS AGENT DETECTION BY NUCLEIC ACID (DNA OR RNA); SEVERE ACUTE RESPIRATORY SYNDROME CORONAVIRUS 2 (SARS-COV-2) (CORONAVIRUS DISEASE [COVID-19]), AMPLIFIED PROBE TECHNIQUE, MAKING USE OF HIGH THROUGHPUT TECHNOLOGIES AS DESCRIBED BY CMS-2020-01-R: HCPCS | Performed by: NURSE PRACTITIONER

## 2020-07-01 LAB
SARS-COV-2 RNA RESP QL NAA+PROBE: NOTDETECTED
SPECIMEN SOURCE: NORMAL

## 2020-07-02 ENCOUNTER — TELEPHONE (OUTPATIENT)
Dept: OCCUPATIONAL MEDICINE | Facility: CLINIC | Age: 55
End: 2020-07-02

## 2020-07-02 NOTE — TELEPHONE ENCOUNTER
Phone Number Called: [phone] (home)       Call outcome: Spoke to patient regarding message below.    Message: pt notified results Negative and to report to contact with Infection Prevention to return to work    Pt stated understanding

## 2020-07-14 ENCOUNTER — GYNECOLOGY VISIT (OUTPATIENT)
Dept: OBGYN | Facility: CLINIC | Age: 55
End: 2020-07-14
Payer: COMMERCIAL

## 2020-07-14 VITALS — WEIGHT: 237 LBS | SYSTOLIC BLOOD PRESSURE: 154 MMHG | BODY MASS INDEX: 38.25 KG/M2 | DIASTOLIC BLOOD PRESSURE: 87 MMHG

## 2020-07-14 DIAGNOSIS — Z01.419 WELL WOMAN EXAM: ICD-10-CM

## 2020-07-14 DIAGNOSIS — N95.2 VAGINAL ATROPHY: ICD-10-CM

## 2020-07-14 PROCEDURE — 99386 PREV VISIT NEW AGE 40-64: CPT | Performed by: OBSTETRICS & GYNECOLOGY

## 2020-07-14 RX ORDER — CONJUGATED ESTROGENS 0.62 MG/G
CREAM VAGINAL
Qty: 1 TUBE | Refills: 3 | Status: SHIPPED | OUTPATIENT
Start: 2020-07-14 | End: 2021-01-19 | Stop reason: SDUPTHER

## 2020-07-14 NOTE — PROGRESS NOTES
Alexandra Aden [id number] y.o.  female presents for Annual Well Woman Exam.  Patient currently on HRt , modified , and is taking only 1/2 tab prescribed and still has hot flashes . Told to taper off HRT .   Patient however with vaginal dryness   ROS: Patient is feeling well. No dyspnea or chest pain on exertion. No Abdominal pain, change in bowel habits, black or bloody stools. No urinary sx. GYN ROS:no breast pain or new or enlarging lumps on self exam, she complains of no hot flashes . . Denies breast tenderness, mass, discharge, changes in size or contour, or abnormal cyclic discomfort., Negative breast symptoms: tenderness, pain, mass No neurological complaints.  Past Medical History:   Diagnosis Date   • Acquired hypothyroidism 2016   • Anesthesia     nausea   • Arthritis     spine   • Basal cell carcinoma 2016   • Depression 2016   • Essential hypertension 2016   • Family history of melanoma 2016   • GERD (gastroesophageal reflux disease)    • Hyperlipidemia 2016   • Hypertension    • Nasal drainage    • Other specified disorder of intestines     diarrhea and constipation   • Prediabetes 2016   • Scarlet fever    • Snoring    • Substance abuse (HCC)     alcohol; quit    • Thyroid disease    • Viral meningitis    • Vitamin D deficiency 2016     None  Allergy:   Patient has no known allergies.    LMP:       Patient's last menstrual period was 2013. .     Menstrual History: postmenopausal  Contraceptive Method:none      Objective : The patient appears well, alert and oriented x 3, in no acute distress.  /87 (BP Location: Right arm, Patient Position: Sitting)   Wt 107.5 kg (237 lb)   HEENT Exam: EOMI, TATUM, no adenopathy or thyromegaly.  Lungs: Clear to Auscultation and Percussion.  Cor: S1 and S2 normal, no murmurs, or rubs   Abdomen: Soft without tenderness, guarding mass or organomegaly.  Extremities: No edema, pulses equal  Neurological: Normal No  focal signs  Breast Exam:deferred   Pelvic: External genitalia,urethral meatus, urethra, bladder and vagina normal. Cervix, uterus and adnexa intact and normal.  Anus and perineum normal. Bimanual and rectovaginal without masses or tenderness., positive findings: vaginal atrophy , and cervix as well flush to cuff, Right labia , and vulva , with some erythema , and moisture changes     Lab:No results found for this or any previous visit (from the past 336 hour(s)).    Assessment:  well woman  Menopausal  Vaginal atrophy   Inadequate HRT levels and delivery   Vulvitis   Plan:mammogram : done   pap smear 2019   Premarin  Vaginal :  0.5gm ( 1/4applicator) per vagina QHS x2weeks , then HS 2x/week   Mycolog to vulva  BID PRN   D/C oral HRT :

## 2020-07-14 NOTE — NON-PROVIDER
New patient here today to establish care.  C/o having a very bad itching and dryness in the vagina.  Phone # [phone]  Pharmacy verified.

## 2020-08-14 RX ORDER — LEVOTHYROXINE SODIUM 0.12 MG/1
125 TABLET ORAL
Qty: 90 TAB | Refills: 0 | Status: SHIPPED | OUTPATIENT
Start: 2020-08-14 | End: 2020-11-23

## 2020-08-14 NOTE — TELEPHONE ENCOUNTER
Received request via: Patient    Was the patient seen in the last year in this department? Yes    Does the patient have an active prescription (recently filled or refills available) for medication(s) requested? No     Future Appointments       Provider Department Center    8/21/2020 9:00 AM Olivia Batista M.D. [facility]     8/24/2020 4:00 PM Ana Lilia Pal M.D. [facility]

## 2020-08-21 ENCOUNTER — TELEMEDICINE (OUTPATIENT)
Dept: SLEEP MEDICINE | Facility: MEDICAL CENTER | Age: 55
End: 2020-08-21
Payer: COMMERCIAL

## 2020-08-21 VITALS — BODY MASS INDEX: 36.16 KG/M2 | HEIGHT: 66 IN | WEIGHT: 225 LBS

## 2020-08-21 DIAGNOSIS — G47.33 OSA (OBSTRUCTIVE SLEEP APNEA): ICD-10-CM

## 2020-08-21 PROCEDURE — 99212 OFFICE O/P EST SF 10 MIN: CPT | Mod: 95,CR | Performed by: INTERNAL MEDICINE

## 2020-08-21 NOTE — PROGRESS NOTES
Telemedicine: Established Patient   This evaluation was conducted via Platform ZOOM using secure and encrypted videoconferencing technology.     Subjective:   CC:   Chief Complaint   Patient presents with   • Follow-Up     EMRE, will bring chip in on Monday for DL       Alexandra Masterson is a 54 y.o. female presenting for evaluation and management of EMRE.  She has moderate EMRE, on AutoPap:4-20 cm H20.  She states she uses CPAP nightly, in fact cannot sleep without it. Compliance card cannot be downloaded online.  She acquired new CPAP supplies.  She sleeps well on CPAP and has no specific issues with treatment. Weight has been stable.        ROS      No Known Allergies    Current medicines (including changes today)  Current Outpatient Medications   Medication Sig Dispense Refill   • levothyroxine (EUTHYROX) 125 MCG Tab Take 1 Tab by mouth Every morning on an empty stomach. 90 Tab 0   • citalopram (CELEXA) 40 MG Tab Take 1 tablet by mouth once daily 90 Tab 1   • estrogens, conjugated (PREMARIN) 0.625 MG/GM Cream 0.5gm ( 1/4applicator) per vagina QHS x2weeks , then HS 2x/week 1 Tube 3   • nystatin/triamcinolone (MYCOLOG) 836696-3.1 UNIT/GM-% Cream Apply 1 Application to affected area(s) 2 Times a Day. Apply sparingly to affected area BID PRN 1 Tube 0   • buPROPion (WELLBUTRIN XL) 150 MG XL tablet Take 1 Tab by mouth every morning. 30 Tab 0   • amLODIPine (NORVASC) 10 MG Tab Take 1 tablet by mouth once daily 90 Tab 3   • losartan (COZAAR) 100 MG Tab TAKE 1 TABLET BY MOUTH ONCE DAILY WITH  HCTZ  25MG 90 Tab 3   • albuterol 108 (90 Base) MCG/ACT Aero Soln inhalation aerosol Inhale 2 Puffs by mouth every 6 hours as needed. 8.5 g 0   • fluticasone (FLONASE) 50 MCG/ACT nasal spray USE TWO SPRAY(S) IN EACH NOSTRIL ONCE DAILY 16 g 3   • Loratadine (CLARITIN) 10 MG CAPS Take  by mouth every day.     • Multiple Vitamins-Minerals (MULTIVITAMIN PO) Take  by mouth every day.     • progesterone (PROMETRIUM) 100 MG Cap Take 1  capsule by mouth once daily (Patient not taking: Reported on 8/21/2020) 90 Cap 3   • estradiol (ESTRACE) 0.5 MG tablet TAKE 1 TABLET BY MOUTH ONCE DAILY (Patient not taking: Reported on 8/21/2020) 90 Tab 3     No current facility-administered medications for this visit.        Patient Active Problem List    Diagnosis Date Noted   • Vaginal atrophy 07/14/2020     Priority: High   • Alcohol abuse, in remission 04/10/2019   • Itching 03/13/2019   • Menopause 03/13/2019   • EMRE (obstructive sleep apnea) 06/27/2017   • Essential hypertension 09/21/2016   • Postoperative hypothyroidism 09/21/2016   • Recurrent major depressive disorder, in partial remission (HCC) 09/21/2016   • Vitamin D deficiency 09/21/2016   • Hyperlipidemia 09/21/2016   • Prediabetes 09/21/2016   • Obesity (BMI 30-39.9) 09/21/2016   • Basal cell carcinoma 09/21/2016   • Family history of melanoma 09/21/2016       Family History   Problem Relation Age of Onset   • Dementia Mother    • Cancer Father         melanoma   • Cancer Brother         basal cell carcinomas   • Diabetes Maternal Grandfather    • Heart Disease Neg Hx    • Stroke Neg Hx        She  has a past medical history of Acquired hypothyroidism (9/21/2016), Anesthesia, Arthritis, Basal cell carcinoma (9/21/2016), Depression (9/21/2016), Essential hypertension (9/21/2016), Family history of melanoma (9/21/2016), GERD (gastroesophageal reflux disease), Hyperlipidemia (9/21/2016), Hypertension, Nasal drainage, Other specified disorder of intestines, Prediabetes (9/21/2016), Scarlet fever, Snoring, Substance abuse ([facility]), Thyroid disease, Viral meningitis, and Vitamin D deficiency (9/21/2016). She also has no past medical history of Addisons disease ([facility]), Adrenal disorder ([facility]), Allergy, Anemia, Anxiety, Arrhythmia, Asthma, Blood transfusion without reported diagnosis, Cataract, CHF (congestive heart failure) ([facility]), Clotting disorder ([facility]), COPD (chronic obstructive pulmonary disease) ([facility]),  "Cushings syndrome (HCC), Diabetes (HCC), Diabetic neuropathy (HCC), Glaucoma, Goiter, Head ache, Heart attack (HCC), Heart murmur, HIV (human immunodeficiency virus infection) (HCC), IBD (inflammatory bowel disease), Kidney disease, Migraine, Muscle disorder, Osteoporosis, Parathyroid disorder (HCC), Pituitary disease (HCC), Pulmonary emphysema (HCC), Seizure (HCC), Sickle cell disease (HCC), Stroke (HCC), Tuberculosis, or Urinary tract infection.  She  has a past surgical history that includes bonny by laparoscopy (1994); gyn surgery (2008); and thyroidectomy total (8/13/2013).       Objective:   Ht 1.676 m (5' 6\")   Wt 102.1 kg (225 lb)   LMP 06/25/2013   BMI 36.32 kg/m²     Physical Exam    Assessment and Plan:   The following treatment plan was discussed:     1. EMRE (obstructive sleep apnea)    The patient will bring in her CPAP compliance card to the sleep clinic for download.  She has no specific issues with CPAP therapy.  Continue AutoPap: 4-20 cm H20.  Follow-up: No follow-ups on file.         "

## 2020-08-24 ENCOUNTER — OFFICE VISIT (OUTPATIENT)
Dept: DERMATOLOGY | Facility: IMAGING CENTER | Age: 55
End: 2020-08-24
Payer: COMMERCIAL

## 2020-08-24 DIAGNOSIS — Z85.828 HISTORY OF NONMELANOMA SKIN CANCER: ICD-10-CM

## 2020-08-24 DIAGNOSIS — L81.4 LENTIGINES: ICD-10-CM

## 2020-08-24 DIAGNOSIS — D22.9 MULTIPLE MELANOCYTIC NEVI: ICD-10-CM

## 2020-08-24 DIAGNOSIS — D48.5 NEOPLASM OF UNCERTAIN BEHAVIOR OF SKIN: ICD-10-CM

## 2020-08-24 DIAGNOSIS — Z12.83 SKIN CANCER SCREENING: ICD-10-CM

## 2020-08-24 DIAGNOSIS — D18.01 CHERRY ANGIOMA: ICD-10-CM

## 2020-08-24 DIAGNOSIS — L82.1 SEBORRHEIC KERATOSES: ICD-10-CM

## 2020-08-24 PROCEDURE — 11102 TANGNTL BX SKIN SINGLE LES: CPT | Performed by: DERMATOLOGY

## 2020-08-24 PROCEDURE — 99214 OFFICE O/P EST MOD 30 MIN: CPT | Mod: 25 | Performed by: DERMATOLOGY

## 2020-08-24 NOTE — PROGRESS NOTES
DERMATOLOGY NOTE  FOLLOW UP VISIT       Chief complaint: Follow-Up (SAM )   SAM   HPI: mole   Location: left thigh   Time present: 2 years   Painful lesion: No  Itching lesion: No  Enlarging lesion: No  Anything make it better or worse? no    History of skin cancer: Yes, Details: BCC left shoulder x3, last in 2018; back prior to that  History of precancers/actinic keratoses: Yes, Details: cryotherapy on tip ofn ose  History of biopsies:Yes, Details: see above  History of blistering/severe sunburns:Yes, Details: as a child  Family history of skin cancer:Yes, Details: Melanoma-  Family history of atypical moles:No    Past Medical History:   Diagnosis Date   • Acquired hypothyroidism 2016   • Anesthesia     nausea   • Arthritis     spine   • Basal cell carcinoma 2016   • Depression 2016   • Essential hypertension 2016   • Family history of melanoma 2016   • GERD (gastroesophageal reflux disease)    • Hyperlipidemia 2016   • Hypertension    • Nasal drainage    • Other specified disorder of intestines     diarrhea and constipation   • Prediabetes 2016   • Scarlet fever    • Snoring    • Substance abuse (HCC)     alcohol; quit    • Thyroid disease    • Viral meningitis    • Vitamin D deficiency 2016        Family History   Problem Relation Age of Onset   • Dementia Mother    • Cancer Father         melanoma   • Cancer Brother         basal cell carcinomas   • Diabetes Maternal Grandfather    • Heart Disease Neg Hx    • Stroke Neg Hx         Social History     Socioeconomic History   • Marital status:      Spouse name: Not on file   • Number of children: Not on file   • Years of education: Not on file   • Highest education level: Not on file   Occupational History   • Not on file   Social Needs   • Financial resource strain: Not on file   • Food insecurity     Worry: Not on file     Inability: Not on file   • Transportation needs     Medical: Not on file      Non-medical: Not on file   Tobacco Use   • Smoking status: Former Smoker     Packs/day: 0.50     Years: 15.00     Pack years: 7.50     Types: Cigarettes     Quit date: 2015     Years since quittin.9   • Smokeless tobacco: Never Used   Substance and Sexual Activity   • Alcohol use: No     Alcohol/week: 0.0 oz     Comment: Sober since    • Drug use: No   • Sexual activity: Yes     Partners: Male     Birth control/protection: Surgical, Male Sterilization   Lifestyle   • Physical activity     Days per week: Not on file     Minutes per session: Not on file   • Stress: Not on file   Relationships   • Social connections     Talks on phone: Not on file     Gets together: Not on file     Attends [employer] service: Not on file     Active member of club or organization: Not on file     Attends meetings of clubs or organizations: Not on file     Relationship status: Not on file   • Intimate partner violence     Fear of current or ex partner: Not on file     Emotionally abused: Not on file     Physically abused: Not on file     Forced sexual activity: Not on file   Other Topics Concern   • Not on file   Social History Narrative   • Not on file        No Known Allergies     MEDICATIONS:  Medications relevant to specialty reviewed.    Current Outpatient Medications:   •  levothyroxine (EUTHYROX) 125 MCG Tab, Take 1 Tab by mouth Every morning on an empty stomach., Disp: 90 Tab, Rfl: 0  •  citalopram (CELEXA) 40 MG Tab, Take 1 tablet by mouth once daily, Disp: 90 Tab, Rfl: 1  •  estrogens, conjugated (PREMARIN) 0.625 MG/GM Cream, 0.5gm ( 1/4applicator) per vagina QHS x2weeks , then HS 2x/week, Disp: 1 Tube, Rfl: 3  •  nystatin/triamcinolone (MYCOLOG) 799204-7.1 UNIT/GM-% Cream, Apply 1 Application to affected area(s) 2 Times a Day. Apply sparingly to affected area BID PRN, Disp: 1 Tube, Rfl: 0  •  buPROPion (WELLBUTRIN XL) 150 MG XL tablet, Take 1 Tab by mouth every morning., Disp: 30 Tab, Rfl: 0  •  progesterone  (PROMETRIUM) 100 MG Cap, Take 1 capsule by mouth once daily (Patient not taking: Reported on 8/21/2020), Disp: 90 Cap, Rfl: 3  •  amLODIPine (NORVASC) 10 MG Tab, Take 1 tablet by mouth once daily, Disp: 90 Tab, Rfl: 3  •  losartan (COZAAR) 100 MG Tab, TAKE 1 TABLET BY MOUTH ONCE DAILY WITH  HCTZ  25MG, Disp: 90 Tab, Rfl: 3  •  estradiol (ESTRACE) 0.5 MG tablet, TAKE 1 TABLET BY MOUTH ONCE DAILY (Patient not taking: Reported on 8/21/2020), Disp: 90 Tab, Rfl: 3  •  albuterol 108 (90 Base) MCG/ACT Aero Soln inhalation aerosol, Inhale 2 Puffs by mouth every 6 hours as needed., Disp: 8.5 g, Rfl: 0  •  fluticasone (FLONASE) 50 MCG/ACT nasal spray, USE TWO SPRAY(S) IN EACH NOSTRIL ONCE DAILY, Disp: 16 g, Rfl: 3  •  Loratadine (CLARITIN) 10 MG CAPS, Take  by mouth every day., Disp: , Rfl:   •  Multiple Vitamins-Minerals (MULTIVITAMIN PO), Take  by mouth every day., Disp: , Rfl:      REVIEW OF SYSTEMS:   Positive for skin (see HPI)  Negative for fevers, chills and cough     EXAM:  LMP 06/25/2013   Constitutional: Well-developed, well-nourished, and in no distress.   Head: normocephalic and atraumatic.  Neck: Normal range of motion. Neck supple.   Pulmonary/Chest: Effort normal.   Neurological: Alert and oriented.    A total body skin exam was performed excluding the genitals per patient preference and including the following areas: head (including face), neck, chest, abdomen, groin/buttocks, back, bilateral upper extremities, and bilateral lower extremities with the following pertinent findings listed below. Remaining above-listed examined areas within normal limits / negative for rashes or lesions.  Nail polish on toe nails.    - left elbow with 5 mm firm pink papule  - left shoulder x3, back x 2 with well healed scar(s) and no evidence of recurrence on inspection or palpation  -trunk and extremities with scattered skin light and medium brown uniform macules and papules all of which were morphologically similar and benign  "appearing on exam and with benign-appearing pigment network patterns on dermoscopy   -sun exposed skin of trunk and b/l upper and lower extremities with scattered clinically benign light brown reticulated macules all of which were morphologically similar and none of which were suspicious for skin cancer today on exam  -left thigh, trunk and extremities with scattered brown-grey, waxy, hyperkeratotic papules and plaques  -trunk and extremities with cherry red macules and papules    IMPRESSION / PLAN:    1. Neoplasm of uncertain behavior of skin x2  - Discussed monitoring vs biopsy, patient agreeable to biopsy today, see procedure note below  - Biopsy Procedure Note: biopsy by shave technique  - Location: A. Left elbow  - Size: as noted in exam  - Total specimens sent for pathology: 1  - Photo taken with patient permission (see media tab)  - Preoperative diagnosis: r/o BCC vs DF/scar vs other  - Plan wle if +    Shave bx x1   Risks, benefits and alternatives of procedure discussed, verbal consent obtained for photo and written informed consent obtained for procedure. Time out completed. Area of biopsy prepped with alcohol. Anesthesia with 1% lidocaine with epinephrine administered with 30 gauge needle. Shave biopsy of the site performed. Hemostasis achieved with aluminum chloride. Vaseline applied to wound with bandage. Patient tolerated procedure well and there were no complications. The specimen was sent to the pathology lab by the staff. Wound care was discussed.'    2. Multiple melanocytic nevi  -Reviewed moles and melanoma. Patient advised to return sooner than scheduled if concerning changes in moles are noted.  -Reviewed sun protective behavior including sunscreen application and reapplication, appropriate choice of SPF, hat, protective clothing, seeking shade and never choosing to \"lie out\" in the sun.    3. Lentigines  - Discussed benign nature of lesions, related to sun exposure  - Discussed sun " protection    4. Seborrheic keratoses  -nature of the diagnosis was discussed in detail  -clinically benign today on exam, reassurance was given  -continue to monitor for any changes, pt to call if any changes occur    5. Cherry angioma  -nature of the diagnosis was discussed in detail  -clinically benign today on exam, reassurance was given  -continue to monitor for any changes, pt to call if any changes occur    6. History of nonmelanoma skin cancer  -no evidence of recurrence of lesion(s) on exam today   -patient to call and return to clinic sooner than next scheduled dermatology visit if new/changing/otherwise concerning skin lesions are noted  -recommend diligent photoprotection and regular TSEs    7. Skin cancer screening  Skin cancer education  - discussed importance of sun protective clothing, eyewear  - discussed importance of daily use of broad spectrum sunscreen with SPF 30 or greater, as well as need for reapplication ~every 2 hours when exposed to UVR  - discussed importance of regular self-exams, ideally once per month, every 6 months exams in clinic  - ABCDE's of melanoma discussed  - patient to bring any new or concerning lesions to my attention     Return to clinic in: Return in about 6 months (around 2/24/2021) for SAM, sooner pending path. and as needed for any new or changing skin lesions.    Ana Lilia Pal M.D.

## 2020-08-31 ENCOUNTER — TELEPHONE (OUTPATIENT)
Dept: DERMATOLOGY | Facility: IMAGING CENTER | Age: 55
End: 2020-08-31

## 2020-08-31 NOTE — TELEPHONE ENCOUNTER
Called patient. Informed that pathology was consistent with a malignant lesion (basal cell carcinoma, nodular type). Further treatment is recommended.     Discussed recommended options, including wide local excision (WLE). Patient agreeable. Transferred to scheduling for appointment.     See media tab for path report (D-path).    No pacemaker/defibrilator  No artificial joints/valves  No anticoagulants  No prophylactic antibiotics needed  For auth: CPT 70858/00835; ICD-10 74074

## 2020-09-21 ENCOUNTER — IMMUNIZATION (OUTPATIENT)
Dept: OCCUPATIONAL MEDICINE | Facility: CLINIC | Age: 55
End: 2020-09-21

## 2020-09-21 DIAGNOSIS — Z23 NEED FOR VACCINATION: ICD-10-CM

## 2020-09-21 PROCEDURE — 90686 IIV4 VACC NO PRSV 0.5 ML IM: CPT | Performed by: PREVENTIVE MEDICINE

## 2020-10-19 ENCOUNTER — OFFICE VISIT (OUTPATIENT)
Dept: DERMATOLOGY | Facility: IMAGING CENTER | Age: 55
End: 2020-10-19
Payer: COMMERCIAL

## 2020-10-19 VITALS
TEMPERATURE: 98.8 F | WEIGHT: 230 LBS | DIASTOLIC BLOOD PRESSURE: 76 MMHG | BODY MASS INDEX: 36.96 KG/M2 | HEIGHT: 66 IN | SYSTOLIC BLOOD PRESSURE: 140 MMHG

## 2020-10-19 DIAGNOSIS — C44.619 BASAL CELL CARCINOMA OF SKIN OF LEFT UPPER LIMB, INCLUDING SHOULDER: ICD-10-CM

## 2020-10-19 PROCEDURE — 12032 INTMD RPR S/A/T/EXT 2.6-7.5: CPT | Performed by: DERMATOLOGY

## 2020-10-19 PROCEDURE — 11602 EXC TR-EXT MAL+MARG 1.1-2 CM: CPT | Performed by: DERMATOLOGY

## 2020-10-19 NOTE — PROGRESS NOTES
"PROCEDURE NOTE:    MALIGNANT LESION - WIDE LOCAL EXCISION    After patient received diagnosis of basal cell carcinoma, nodular type, further management was discussed, including Mohs vs wide local excision vs radiation therapy vs curettage & electrodesiccation (C&D) vs topical creams vs cryotherapy. Patient opted for wide local excision. Risks, benefits and alternatives of procedure, including, but not limited to scar, bleeding, pain, infection, nerve damage, recurrence of tumor, failed surgery, and need for further surgery, were discussed and written informed consent obtained. Correct site was verified by patient and myself, and verbal time out completed.     Allergies reviewed: Yes  Pacemaker/defibrillator: No  Artificial joints: No  Antibiotics given: No  Blood thinners/anticoagulants: No    Pre-op diagnosis: basal cell carcinoma, nodular type  Post-op diagnosis: Same  Site: left elbow  Pre-op size: 0.6 cm + 4 mm margin = 1.4 cm  Post-op Antibiotics: no    /76   Temp 37.1 °C (98.8 °F)   Ht 1.676 m (5' 6\")   Wt 104.3 kg (230 lb)     Procedure: Area of surgery was prepped with alcohol, marked with 4 mm margins, and with sterile marking pen. Anesthesia with 1% lidocaine with epinephrine administered with 30 gauge needle. The area was again cleaned with chlorhexidine swab. With sterile technique, a 15 blade scalpel was used to make an elliptical incision around the tumor to the level of the subcutaneous fat. The tumor was removed. Bleeding was minimal, and hemostasis was achieved with pressure, hyfrecation. Specimen was placed into biopsy container and sent to pathology by staff.    Intermediate Closure:  Buried vertical mattress sutures were placed with 4.0 monocryl to close dead space. 4.0 prolene superficial running sutures were placed to approximate wound edge.  Vaseline applied to wound with bandage. Patient tolerated procedure well and there were no complications, blood loss was minimal.     Final wound " size: 3.5 cm    Bandage was placed with vaseline, telfa, gauze and tape. Wound care was discussed with the patient, and written instructions were provided. Patient to return to clinic in 10-14 days for suture removal. Patient to call us if any problems or concerns with the procedure site arise prior to scheduled appointment.     Additional follow-up will be planned for bi-annual total skin exam due Feb/March 2021    Ana Lilia Pal M.D.

## 2020-10-26 ENCOUNTER — TELEPHONE (OUTPATIENT)
Dept: DERMATOLOGY | Facility: IMAGING CENTER | Age: 55
End: 2020-10-26

## 2020-10-26 NOTE — TELEPHONE ENCOUNTER
Called patient from [facility]. Notified patient of pathology results from [facility] on 10/19/20 performed by Dr. Ana Lilia Pal. The BCC has been completely excised and there is no further treatment needed. Patient understood these results and had no further questions.

## 2020-11-02 ENCOUNTER — APPOINTMENT (OUTPATIENT)
Dept: DERMATOLOGY | Facility: IMAGING CENTER | Age: 55
End: 2020-11-02
Payer: COMMERCIAL

## 2020-11-23 DIAGNOSIS — F33.41 RECURRENT MAJOR DEPRESSIVE DISORDER, IN PARTIAL REMISSION (HCC): ICD-10-CM

## 2020-11-23 RX ORDER — CITALOPRAM 40 MG/1
TABLET ORAL
Qty: 90 TAB | Refills: 0 | Status: SHIPPED | OUTPATIENT
Start: 2020-11-23 | End: 2021-01-19 | Stop reason: SDUPTHER

## 2020-11-23 RX ORDER — LEVOTHYROXINE SODIUM 125 UG/1
TABLET ORAL
Qty: 90 TAB | Refills: 0 | Status: SHIPPED | OUTPATIENT
Start: 2020-11-23 | End: 2021-01-19 | Stop reason: SDUPTHER

## 2020-11-24 ENCOUNTER — HOSPITAL ENCOUNTER (OUTPATIENT)
Dept: LAB | Facility: MEDICAL CENTER | Age: 55
End: 2020-11-24
Attending: FAMILY MEDICINE
Payer: COMMERCIAL

## 2020-11-24 DIAGNOSIS — I10 ESSENTIAL HYPERTENSION: ICD-10-CM

## 2020-11-24 DIAGNOSIS — E89.0 POSTOPERATIVE HYPOTHYROIDISM: ICD-10-CM

## 2020-11-24 LAB
ALBUMIN SERPL BCP-MCNC: 4.2 G/DL (ref 3.2–4.9)
ALBUMIN/GLOB SERPL: 1.8 G/DL
ALP SERPL-CCNC: 66 U/L (ref 30–99)
ALT SERPL-CCNC: 25 U/L (ref 2–50)
ANION GAP SERPL CALC-SCNC: 7 MMOL/L (ref 7–16)
AST SERPL-CCNC: 16 U/L (ref 12–45)
BILIRUB SERPL-MCNC: 0.3 MG/DL (ref 0.1–1.5)
BUN SERPL-MCNC: 10 MG/DL (ref 8–22)
CALCIUM SERPL-MCNC: 9.4 MG/DL (ref 8.5–10.5)
CHLORIDE SERPL-SCNC: 105 MMOL/L (ref 96–112)
CO2 SERPL-SCNC: 28 MMOL/L (ref 20–33)
CREAT SERPL-MCNC: 0.58 MG/DL (ref 0.5–1.4)
GLOBULIN SER CALC-MCNC: 2.3 G/DL (ref 1.9–3.5)
GLUCOSE SERPL-MCNC: 110 MG/DL (ref 65–99)
POTASSIUM SERPL-SCNC: 3.9 MMOL/L (ref 3.6–5.5)
PROT SERPL-MCNC: 6.5 G/DL (ref 6–8.2)
SODIUM SERPL-SCNC: 140 MMOL/L (ref 135–145)
TSH SERPL DL<=0.005 MIU/L-ACNC: 1.07 UIU/ML (ref 0.38–5.33)

## 2020-11-24 PROCEDURE — 80053 COMPREHEN METABOLIC PANEL: CPT

## 2020-11-24 PROCEDURE — 36415 COLL VENOUS BLD VENIPUNCTURE: CPT

## 2020-11-24 PROCEDURE — 84443 ASSAY THYROID STIM HORMONE: CPT

## 2021-01-19 RX ORDER — CONJUGATED ESTROGENS 0.62 MG/G
CREAM VAGINAL
Qty: 30 G | Refills: 3 | Status: SHIPPED | OUTPATIENT
Start: 2021-01-19

## 2022-05-24 ENCOUNTER — APPOINTMENT (OUTPATIENT)
Dept: URBAN - METROPOLITAN AREA CLINIC 187 | Age: 57
Setting detail: DERMATOLOGY
End: 2022-05-24

## 2022-05-24 PROBLEM — C44.612 BASAL CELL CARCINOMA OF SKIN OF RIGHT UPPER LIMB, INCLUDING SHOULDER: Status: ACTIVE | Noted: 2022-05-24

## 2022-05-24 PROCEDURE — 17261 DSTRJ MAL LES T/A/L .6-1.0CM: CPT

## 2022-05-24 PROCEDURE — OTHER CURETTAGE AND DESTRUCTION: OTHER

## 2022-05-24 NOTE — PROCEDURE: CURETTAGE AND DESTRUCTION
Detail Level: Detailed
Biopsy Photograph Reviewed: Yes
Number Of Curettages: 2
Size Of Lesion In Cm: 0.7
Size Of Lesion After Curettage: 1
Add Intralesional Injection: No
Concentration (Mg/Ml Or Millions Of Plaque Forming Units/Cc): 0.01
Anesthesia Type: 1% lidocaine with epinephrine
Anesthesia Volume In Cc: 1.1
Cautery Type: electrodesiccation
What Was Performed First?: Curettage
Final Size Statement: The size of the lesion after curettage was
Additional Information: (Optional): The wound was cleaned, and a pressure dressing was applied.  The patient received detailed post-op instructions.
Consent was obtained from the patient. The risks, benefits and alternatives to therapy were discussed in detail. Specifically, the risks of infection, scarring, bleeding, prolonged wound healing, nerve injury, incomplete removal, allergy to anesthesia and recurrence were addressed. Alternatives to ED&C, such as: surgical removal and XRT were also discussed.  Prior to the procedure, the treatment site was clearly identified and confirmed by the patient. All components of Universal Protocol/PAUSE Rule completed.
Post-Care Instructions: I reviewed with the patient in detail post-care instructions. Patient is to keep the area dry for 48 hours, and not to engage in any swimming until the area is healed. Should the patient develop any fevers, chills, bleeding, severe pain patient will contact the office immediately.
Bill As A Line Item Or As Units: Line Item

## 2022-11-22 ENCOUNTER — APPOINTMENT (OUTPATIENT)
Dept: URBAN - METROPOLITAN AREA CLINIC 187 | Age: 57
Setting detail: DERMATOLOGY
End: 2022-11-22

## 2022-11-22 DIAGNOSIS — L44.8 OTHER SPECIFIED PAPULOSQUAMOUS DISORDERS: ICD-10-CM

## 2022-11-22 DIAGNOSIS — L30.4 ERYTHEMA INTERTRIGO: ICD-10-CM

## 2022-11-22 PROCEDURE — 99214 OFFICE O/P EST MOD 30 MIN: CPT

## 2022-11-22 PROCEDURE — OTHER PRESCRIPTION: OTHER

## 2022-11-22 PROCEDURE — OTHER COUNSELING: OTHER

## 2022-11-22 PROCEDURE — OTHER PRESCRIPTION MEDICATION MANAGEMENT: OTHER

## 2022-11-22 PROCEDURE — OTHER REASSURANCE: OTHER

## 2022-11-22 RX ORDER — KETOCONAZOLE 20 MG/G
CREAM TOPICAL
Qty: 60 | Refills: 3 | Status: ERX | COMMUNITY
Start: 2022-11-22

## 2022-11-22 RX ORDER — TRIAMCINOLONE ACETONIDE 1 MG/G
OINTMENT TOPICAL
Qty: 80 | Refills: 3 | Status: ERX | COMMUNITY
Start: 2022-11-22

## 2022-11-22 ASSESSMENT — LOCATION DETAILED DESCRIPTION DERM
LOCATION DETAILED: LEFT ANTERIOR PROXIMAL THIGH
LOCATION DETAILED: LEFT ULNAR DORSAL HAND
LOCATION DETAILED: RIGHT MEDIAL BREAST 2-3:00 REGION
LOCATION DETAILED: LEFT RIB CAGE

## 2022-11-22 ASSESSMENT — LOCATION ZONE DERM
LOCATION ZONE: HAND
LOCATION ZONE: LEG
LOCATION ZONE: TRUNK

## 2022-11-22 ASSESSMENT — LOCATION SIMPLE DESCRIPTION DERM
LOCATION SIMPLE: ABDOMEN
LOCATION SIMPLE: LEFT HAND
LOCATION SIMPLE: RIGHT BREAST
LOCATION SIMPLE: LEFT THIGH

## 2022-11-22 NOTE — PROCEDURE: PRESCRIPTION MEDICATION MANAGEMENT
Detail Level: Zone
Initiate Treatment: Rx: ketoconazole 2% cream (Pt. to mix) w/ RX: triamcinolone 1% oint. Apply BID x 3/4 weeks then as needed for flares
Render In Strict Bullet Format?: No
Plan: -Educated regarding condition and treatment options \\n-Essential to keep skin folds dry\\n-Continue Dove soap, All free and clear detergents\\n-Try not to pick/itch!

## 2022-11-22 NOTE — PROCEDURE: REASSURANCE
Detail Level: Detailed
Hide Additional Notes?: No
Additional Notes (Optional): OTC cortisone as needed for itch

## 2022-11-22 NOTE — PROCEDURE: COUNSELING
Detail Level: Detailed
Detail Level: Simple
Barrier Cream Recommendations: If you provider has recommended a barrier cream: this can be helpful if condition is recurring often. \\nExamples are: \\nDesitin, Elisha's Butt Paste, or Gold Bond Friction Defense. \\nThese can help prevent flare ups, or help restore barrier.

## 2024-12-12 ENCOUNTER — APPOINTMENT (OUTPATIENT)
Dept: URBAN - METROPOLITAN AREA CLINIC 187 | Age: 59
Setting detail: DERMATOLOGY
End: 2024-12-12

## 2024-12-12 DIAGNOSIS — L82.1 OTHER SEBORRHEIC KERATOSIS: ICD-10-CM

## 2024-12-12 DIAGNOSIS — D22 MELANOCYTIC NEVI: ICD-10-CM

## 2024-12-12 DIAGNOSIS — Z85.828 PERSONAL HISTORY OF OTHER MALIGNANT NEOPLASM OF SKIN: ICD-10-CM

## 2024-12-12 DIAGNOSIS — L57.8 OTHER SKIN CHANGES DUE TO CHRONIC EXPOSURE TO NONIONIZING RADIATION: ICD-10-CM

## 2024-12-12 DIAGNOSIS — D18.0 HEMANGIOMA: ICD-10-CM

## 2024-12-12 DIAGNOSIS — L24 IRRITANT CONTACT DERMATITIS: ICD-10-CM

## 2024-12-12 DIAGNOSIS — L81.4 OTHER MELANIN HYPERPIGMENTATION: ICD-10-CM

## 2024-12-12 DIAGNOSIS — D485 NEOPLASM OF UNCERTAIN BEHAVIOR OF SKIN: ICD-10-CM

## 2024-12-12 DIAGNOSIS — L57.0 ACTINIC KERATOSIS: ICD-10-CM

## 2024-12-12 PROBLEM — D22.9 MELANOCYTIC NEVI, UNSPECIFIED: Status: ACTIVE | Noted: 2024-12-12

## 2024-12-12 PROBLEM — L24.9 IRRITANT CONTACT DERMATITIS, UNSPECIFIED CAUSE: Status: ACTIVE | Noted: 2024-12-12

## 2024-12-12 PROBLEM — D48.5 NEOPLASM OF UNCERTAIN BEHAVIOR OF SKIN: Status: ACTIVE | Noted: 2024-12-12

## 2024-12-12 PROBLEM — D18.01 HEMANGIOMA OF SKIN AND SUBCUTANEOUS TISSUE: Status: ACTIVE | Noted: 2024-12-12

## 2024-12-12 PROCEDURE — OTHER SUNSCREEN RECOMMENDATIONS: OTHER

## 2024-12-12 PROCEDURE — OTHER PRESCRIPTION MEDICATION MANAGEMENT: OTHER

## 2024-12-12 PROCEDURE — OTHER PRESCRIPTION: OTHER

## 2024-12-12 PROCEDURE — OTHER MIPS QUALITY: OTHER

## 2024-12-12 PROCEDURE — OTHER OBSERVATION: OTHER

## 2024-12-12 PROCEDURE — OTHER BIOPSY BY SHAVE METHOD: OTHER

## 2024-12-12 PROCEDURE — 11102 TANGNTL BX SKIN SINGLE LES: CPT

## 2024-12-12 PROCEDURE — OTHER COUNSELING: OTHER

## 2024-12-12 PROCEDURE — 99214 OFFICE O/P EST MOD 30 MIN: CPT | Mod: 25

## 2024-12-12 RX ORDER — CLOBETASOL PROPIONATE 0.5 MG/G
OINTMENT TOPICAL
Qty: 60 | Refills: 3 | Status: ERX | COMMUNITY
Start: 2024-12-12

## 2024-12-12 ASSESSMENT — LOCATION DETAILED DESCRIPTION DERM
LOCATION DETAILED: RIGHT SUPERIOR LATERAL UPPER BACK
LOCATION DETAILED: NASAL SUPRATIP
LOCATION DETAILED: RIGHT INFERIOR LATERAL NECK
LOCATION DETAILED: RIGHT RIB CAGE
LOCATION DETAILED: LEFT SUPERIOR UPPER BACK
LOCATION DETAILED: RIGHT MEDIAL FRONTAL SCALP
LOCATION DETAILED: RIGHT PROXIMAL DORSAL FOREARM

## 2024-12-12 ASSESSMENT — LOCATION SIMPLE DESCRIPTION DERM
LOCATION SIMPLE: RIGHT BACK
LOCATION SIMPLE: RIGHT ANTERIOR NECK
LOCATION SIMPLE: RIGHT SCALP
LOCATION SIMPLE: RIGHT FOREARM
LOCATION SIMPLE: NOSE
LOCATION SIMPLE: LEFT UPPER BACK
LOCATION SIMPLE: ABDOMEN

## 2024-12-12 ASSESSMENT — BSA RASH: BSA RASH: 10

## 2024-12-12 ASSESSMENT — ITCH NUMERIC RATING SCALE: HOW SEVERE IS YOUR ITCHING?: 8

## 2024-12-12 ASSESSMENT — LOCATION ZONE DERM
LOCATION ZONE: NOSE
LOCATION ZONE: TRUNK
LOCATION ZONE: NECK
LOCATION ZONE: SCALP
LOCATION ZONE: ARM

## 2024-12-12 ASSESSMENT — SEVERITY ASSESSMENT 2020: SEVERITY 2020: MODERATE

## 2024-12-12 NOTE — PROCEDURE: OBSERVATION
Detail Level: Simple
Size Of Lesion In Cm (Optional): 0
Body Location Override (Optional - Billing Will Still Be Based On Selected Body Map Location If Applicable): shoulders and back
Detail Level: Detailed

## 2024-12-12 NOTE — PROCEDURE: PRESCRIPTION MEDICATION MANAGEMENT
Initiate Treatment: RX: clobetasol oint bid x 2 weeks \\nStart Cerave cream/lotion moisturizing after topical steroid\\nClaritin vs Zyrtec
Render In Strict Bullet Format?: No
Detail Level: Zone
Plan: Call if not improving after 2 weeks
Detail Level: Simple
Initiate Treatment: 5fu 5% cream mixture with calcipotriene bid x 5days to 7 days sent thru [facility]

## 2024-12-12 NOTE — PROCEDURE: BIOPSY BY SHAVE METHOD
Body Location Override (Optional - Billing Will Still Be Based On Selected Body Map Location If Applicable): scalp
Detail Level: Detailed
Depth Of Biopsy: dermis
Was A Bandage Applied: Yes
Size Of Lesion In Cm: 0
Biopsy Type: H and E
Biopsy Method: Dermablade
Anesthesia Type: 1% lidocaine with epinephrine
Anesthesia Volume In Cc: 0.5
Hemostasis: Drysol
Wound Care: Petrolatum
Dressing: bandage
Destruction After The Procedure: No
Type Of Destruction Used: Curettage
Curettage Text: The wound bed was treated with curettage after the biopsy was performed.
Cryotherapy Text: The wound bed was treated with cryotherapy after the biopsy was performed.
Electrodesiccation Text: The wound bed was treated with electrodesiccation after the biopsy was performed.
Electrodesiccation And Curettage Text: The wound bed was treated with electrodesiccation and curettage after the biopsy was performed.
Silver Nitrate Text: The wound bed was treated with silver nitrate after the biopsy was performed.
Lab: [id number]
Lab Facility: [id number]
Medical Necessity Information: It is in your best interest to select a reason for this procedure from the list below. All of these items fulfill various CMS LCD requirements except the new and changing color options.
Consent: Written consent was obtained and risks were reviewed including but not limited to scarring, infection, bleeding, scabbing, incomplete removal, nerve damage and allergy to anesthesia.
Post-Care Instructions: I reviewed with the patient in detail post-care instructions. Patient is to keep the biopsy site dry overnight, and then apply bacitracin twice daily until healed. Patient may apply hydrogen peroxide soaks to remove any crusting.
Notification Instructions: Patient will be notified of biopsy results. However, patient instructed to call the office if not contacted within 2 weeks.
Billing Type: Third-Party Bill
Information: Selecting Yes will display possible errors in your note based on the variables you have selected. This validation is only offered as a suggestion for you. PLEASE NOTE THAT THE VALIDATION TEXT WILL BE REMOVED WHEN YOU FINALIZE YOUR NOTE. IF YOU WANT TO FAX A PRELIMINARY NOTE YOU WILL NEED TO TOGGLE THIS TO 'NO' IF YOU DO NOT WANT IT IN YOUR FAXED NOTE.

## 2024-12-12 NOTE — HPI: NON-MELANOMA SKIN CANCER F/U (HISTORY OF NMSC)
What Is The Reason For Today's Visit?: Follow Up Non-Melanoma Skin Cancer
How Many Skin Cancers Have You Had?: more than one
Additional History: Waist up exam
When Was Your Last Cancer Diagnosed?: 2022

## 2025-05-22 ENCOUNTER — APPOINTMENT (OUTPATIENT)
Dept: URBAN - METROPOLITAN AREA SURGERY 9 | Age: 60
Setting detail: DERMATOLOGY
End: 2025-05-22

## 2025-05-22 VITALS — DIASTOLIC BLOOD PRESSURE: 80 MMHG | SYSTOLIC BLOOD PRESSURE: 130 MMHG

## 2025-05-22 PROBLEM — C44.41 BASAL CELL CARCINOMA OF SKIN OF SCALP AND NECK: Status: ACTIVE | Noted: 2025-05-22

## 2025-05-22 PROCEDURE — 17312 MOHS ADDL STAGE: CPT

## 2025-05-22 PROCEDURE — OTHER CONSULTATION FOR MOHS SURGERY: OTHER

## 2025-05-22 PROCEDURE — OTHER RETURN TO REFERRING PROVIDER: OTHER

## 2025-05-22 PROCEDURE — 13122 CMPLX RPR S/A/L ADDL 5 CM/>: CPT

## 2025-05-22 PROCEDURE — 13121 CMPLX RPR S/A/L 2.6-7.5 CM: CPT

## 2025-05-22 PROCEDURE — 17311 MOHS 1 STAGE H/N/HF/G: CPT

## 2025-05-22 PROCEDURE — OTHER MOHS SURGERY: OTHER

## 2025-05-22 PROCEDURE — OTHER MIPS QUALITY: OTHER

## 2025-05-29 ENCOUNTER — APPOINTMENT (OUTPATIENT)
Dept: URBAN - METROPOLITAN AREA SURGERY 9 | Age: 60
Setting detail: DERMATOLOGY
End: 2025-05-29

## 2025-05-29 DIAGNOSIS — Z48.02 ENCOUNTER FOR REMOVAL OF SUTURES: ICD-10-CM

## 2025-05-29 PROCEDURE — OTHER SUTURE REMOVAL (GLOBAL PERIOD): OTHER

## 2025-05-29 PROCEDURE — 99024 POSTOP FOLLOW-UP VISIT: CPT

## 2025-05-29 ASSESSMENT — LOCATION ZONE DERM: LOCATION ZONE: SCALP

## 2025-05-29 ASSESSMENT — LOCATION SIMPLE DESCRIPTION DERM: LOCATION SIMPLE: SCALP

## 2025-05-29 ASSESSMENT — LOCATION DETAILED DESCRIPTION DERM: LOCATION DETAILED: RIGHT SUPERIOR PARIETAL SCALP
